# Patient Record
Sex: FEMALE | Race: OTHER | Employment: UNEMPLOYED | URBAN - METROPOLITAN AREA
[De-identification: names, ages, dates, MRNs, and addresses within clinical notes are randomized per-mention and may not be internally consistent; named-entity substitution may affect disease eponyms.]

---

## 2017-09-21 ENCOUNTER — OFFICE VISIT (OUTPATIENT)
Dept: FAMILY MEDICINE CLINIC | Age: 47
End: 2017-09-21

## 2017-09-21 ENCOUNTER — HOSPITAL ENCOUNTER (OUTPATIENT)
Dept: LAB | Age: 47
Discharge: HOME | End: 2017-09-21

## 2017-09-21 VITALS
DIASTOLIC BLOOD PRESSURE: 112 MMHG | BODY MASS INDEX: 36.29 KG/M2 | SYSTOLIC BLOOD PRESSURE: 184 MMHG | WEIGHT: 180 LBS | HEIGHT: 59 IN | HEART RATE: 62 BPM | TEMPERATURE: 98.5 F

## 2017-09-21 DIAGNOSIS — I10 ESSENTIAL HYPERTENSION: Primary | ICD-10-CM

## 2017-09-21 DIAGNOSIS — H53.8 BLURRY VISION: ICD-10-CM

## 2017-09-21 DIAGNOSIS — E78.5 HYPERLIPIDEMIA, UNSPECIFIED HYPERLIPIDEMIA TYPE: ICD-10-CM

## 2017-09-21 DIAGNOSIS — R73.03 PREDIABETES: ICD-10-CM

## 2017-09-21 DIAGNOSIS — I10 ESSENTIAL HYPERTENSION: ICD-10-CM

## 2017-09-21 PROCEDURE — 85025 COMPLETE CBC W/AUTO DIFF WBC: CPT | Performed by: FAMILY MEDICINE

## 2017-09-21 PROCEDURE — 83036 HEMOGLOBIN GLYCOSYLATED A1C: CPT | Performed by: FAMILY MEDICINE

## 2017-09-21 PROCEDURE — 80061 LIPID PANEL: CPT | Performed by: FAMILY MEDICINE

## 2017-09-21 PROCEDURE — 80048 BASIC METABOLIC PNL TOTAL CA: CPT | Performed by: FAMILY MEDICINE

## 2017-09-21 RX ORDER — LOVASTATIN 20 MG/1
20 TABLET ORAL
Qty: 90 TAB | Refills: 3 | Status: SHIPPED | OUTPATIENT
Start: 2017-09-21 | End: 2017-09-26 | Stop reason: SDUPTHER

## 2017-09-21 RX ORDER — LISINOPRIL AND HYDROCHLOROTHIAZIDE 20; 25 MG/1; MG/1
1 TABLET ORAL DAILY
Qty: 90 TAB | Refills: 1 | Status: SHIPPED | OUTPATIENT
Start: 2017-09-21 | End: 2018-06-28 | Stop reason: SDUPTHER

## 2017-09-21 NOTE — LETTER
9/26/2017 10:18 AM 
 
Ms. Bond Cat Anita Albarado [facility] [id number] Dear Ronda Shelton: Please find your most recent results below. Resulted Orders LIPID PANEL Result Value Ref Range LIPID PROFILE Cholesterol, total 188 <200 MG/DL Triglyceride 84 <150 MG/DL Comment:  
   Based on NCEP-ATP III:  Triglycerides <150 mg/dL  is considered normal, 150-199 mg/dL  borderline high,  200-499 mg/dL high and  greater than or equal to 500 mg/dL very high. HDL Cholesterol 58 MG/DL Comment:  
   Based on NCEP ATP III, HDL Cholesterol <40 mg/dL is considered low and >60 mg/dL is elevated. LDL, calculated 113.2 (H) 0 - 100 MG/DL Comment:  
   Based on the NCEP-ATP: LDL-C concentrations are considered  optimal <100 mg/dL, 
near optimal/above Normal 100-129 mg/dL Borderline High: 130-159, High: 160-189 mg/dL Very High: Greater than or equal to 190 mg/dL VLDL, calculated 16.8 MG/DL  
 CHOL/HDL Ratio 3.2 0 - 5.0    
CBC WITH AUTOMATED DIFF Result Value Ref Range WBC 8.3 3.6 - 11.0 K/uL  
 RBC 4.70 3.80 - 5.20 M/uL  
 HGB 13.9 11.5 - 16.0 g/dL HCT 42.4 35.0 - 47.0 % MCV 90.2 80.0 - 99.0 FL  
 MCH 29.6 26.0 - 34.0 PG  
 MCHC 32.8 30.0 - 36.5 g/dL  
 RDW 14.3 11.5 - 14.5 % PLATELET 581 378 - 363 K/uL NEUTROPHILS 52 32 - 75 % LYMPHOCYTES 40 12 - 49 % MONOCYTES 5 5 - 13 % EOSINOPHILS 3 0 - 7 % BASOPHILS 0 0 - 1 %  
 ABS. NEUTROPHILS 4.2 1.8 - 8.0 K/UL  
 ABS. LYMPHOCYTES 3.4 0.8 - 3.5 K/UL  
 ABS. MONOCYTES 0.4 0.0 - 1.0 K/UL  
 ABS. EOSINOPHILS 0.3 0.0 - 0.4 K/UL  
 ABS. BASOPHILS 0.0 0.0 - 0.1 K/UL METABOLIC PANEL, BASIC Result Value Ref Range Sodium 143 136 - 145 mmol/L Potassium 3.4 (L) 3.5 - 5.1 mmol/L Chloride 105 97 - 108 mmol/L  
 CO2 30 21 - 32 mmol/L Anion gap 8 5 - 15 mmol/L Glucose 90 65 - 100 mg/dL BUN 12 6 - 20 MG/DL  Creatinine 0.61 0.55 - 1.02 MG/DL  
 BUN/Creatinine ratio 20 12 - 20    
 GFR est AA >60 >60 ml/min/1.73m2 GFR est non-AA >60 >60 ml/min/1.73m2 Comment:  
   Estimated GFR is calculated using the IDMS-traceable Modification of Diet in Renal Disease (MDRD) Study equation, reported for both  Americans (GFRAA) and non- Americans (GFRNA), and normalized to 1.73m2 body surface area. The physician must decide which value applies to the patient. The MDRD study equation should only be used in individuals age 25 or older. It has not been validated for the following: pregnant women, patients with serious comorbid conditions, or on certain medications, or persons with extremes of body size, muscle mass, or nutritional status. Calcium 8.8 8.5 - 10.1 MG/DL  
HEMOGLOBIN A1C WITH EAG Result Value Ref Range Hemoglobin A1c 6.5 (H) 4.2 - 6.3 % Est. average glucose 140 mg/dL Comment:  
   (NOTE) The eAG should be interpreted with patient characteristics in mind  
since ethnicity, interindividual differences, red cell lifespan,  
variation in rates of glycation, etc. may affect the validity of the  
calculation. RECOMMENDATIONS: 
Per our phone conversation today. See attached on diabetes. Please call me if you have any questions: [phone] Sincerely, Ivelisse Nixon RN for Dr Pamela Navarro.

## 2017-09-21 NOTE — PROGRESS NOTES
Coordination of Care  1. Have you been to the ER, urgent care clinic since your last visit? Hospitalized since your last visit? No    2. Have you seen or consulted any other health care providers outside of the [facility] since your last visit? Include any pap smears or colon screening. No    Medications  Medication Reconciliation Performed: no  Patient does need refills     Learning Assessment Complete?  yes

## 2017-09-21 NOTE — PROGRESS NOTES
Pedro Green is a 55 y.o. female    Issues discussed today include:    1) HTN:  Chronic. Was taking lisinopril 20-25mg daily, but ran out of meds 2-3 months ago. No side effects while on med. Doesn't check BP at home. Reports having HA > 1 mo ago, but none recently. Denies CP, SOB, palpitations, neuro sxs. 2) HLD:  Chronic. Was taking mevacor 20mg daily. Denies myalgias, weakness while on this medication. Ran out 2 mo ago. Requesting refill. 3) Blurry vision:  Has been going on for months. Been a while since she had an exam, is interested in this. No known h/o DM. No eye discharge or pain. 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 90 Tab 1    lovastatin (MEVACOR) 20 mg tablet Take 1 Tab by mouth nightly. 90 Tab 3       Allergies:  No Known Allergies    LMP:  No LMP recorded. Patient is not currently having periods (Reason: Menopause). Social History     Social History    Marital status: SINGLE     Spouse name: N/A    Number of children: N/A    Years of education: N/A     Occupational History    Not on file.      Social History Main Topics    Smoking status: Former Smoker     Types: Cigarettes     Quit date: 7/9/2013    Smokeless tobacco: Not on file      Comment: social every other week    Alcohol use No    Drug use: No    Sexual activity: Not on file     Other Topics Concern    Not on file     Social History Narrative No family history on file. ROS:   Chest Pain:  No  SOB:  No      Physical Exam  Visit Vitals    BP (!) 184/112 (BP 1 Location: Left arm, BP Patient Position: Sitting)    Pulse 62    Temp 98.5 °F (36.9 °C) (Oral)    Ht 4' 11.25\" (1.505 m)    Wt 180 lb (81.6 kg)    BMI 36.05 kg/m2     BP Readings from Last 3 Encounters:   09/21/17 (!) 184/112   12/05/16 106/81   11/03/16 (!) 196/105         Constitutional: Appears well,  No acute distress, Vitals noted  Psychiatric:  Affect normal, Alert and Oriented to person/place/time  Eyes:  Conjunctiva clear, no drainage. PERRL. EOMI.  ENT:  External ears and nose normal, Teeth and gums appear healthy, Mucous membranes moist  Neck:  General inspection normal. Supple. Heart:  Normal HR, Normal S1 and S2,  Regular rhythm. No murmurs, rubs or gallops. No carotid bruit. Lungs:  Clear to auscultation, good respiratory effort, no wheezes, rales or rhonchi  Extremities:  Without edema, good peripheral pulses  Skin:  Warm to palpation, without rashes        Assessment/Plan:      ICD-10-CM ICD-9-CM    1. Essential hypertension I10 401.9 CBC WITH AUTOMATED DIFF      METABOLIC PANEL, BASIC      lisinopril-hydroCHLOROthiazide (PRINZIDE, ZESTORETIC) 20-25 mg per tablet      FL COLLECTION VENOUS BLOOD,VENIPUNCTURE   2. Hyperlipidemia, unspecified hyperlipidemia type E78.5 272.4 LIPID PANEL      lovastatin (MEVACOR) 20 mg tablet      FL COLLECTION VENOUS BLOOD,VENIPUNCTURE   3. Prediabetes R73.03 790.29 HEMOGLOBIN A1C WITH EAG      FL COLLECTION VENOUS BLOOD,VENIPUNCTURE   4. Blurry vision H53.8 368.8      BP high, has been out meds for 2-3 months  Labs today - r/o DM and need for ophtho through access now, otherwise would rec optometry screening, info to be given today  Refilled meds  Close follow up  Pt instructed to check BP q3-4 days and return sooner if consistently > 140/90    Follow-up Disposition:  Return in about 2 weeks (around 10/5/2017) for HTN follow up.   Discuss [facility] appt for well woman exam with pap - last pap 2013 with neg cytology, no HPV testing      ELow Marin MD  [address] [place] [address]

## 2017-09-21 NOTE — PROGRESS NOTES
Printed AVS, provided to pt and reviewed. Pt indicated understanding and had no questions. Told pt that rx's have been sent to pharmacy and they should be ready for  in approximately 2 hrs. The pt's medication that was ordered today was reviewed with the pt. Provided pt with eyecare resources. Pt was told to not to go to any of these optometrist until after her lab results were back because she may need to see a specialist to examine her eyes/retina instead. Explained AN if she needs a specialist she would be referred to [facility]. Explained [facility] process to pt and told them that one of our financial screeners will call them at home for financial information. The Dr had given the pt information including address, map, and phone # for [address]. RN explained financial screening process. Taina Galeas was the .  Jose Elias Chavez RN

## 2017-09-22 LAB
ANION GAP SERPL CALC-SCNC: 8 MMOL/L (ref 5–15)
BASOPHILS # BLD: 0 K/UL (ref 0–0.1)
BASOPHILS NFR BLD: 0 % (ref 0–1)
BUN SERPL-MCNC: 12 MG/DL (ref 6–20)
BUN/CREAT SERPL: 20 (ref 12–20)
CALCIUM SERPL-MCNC: 8.8 MG/DL (ref 8.5–10.1)
CHLORIDE SERPL-SCNC: 105 MMOL/L (ref 97–108)
CHOLEST SERPL-MCNC: 188 MG/DL
CO2 SERPL-SCNC: 30 MMOL/L (ref 21–32)
CREAT SERPL-MCNC: 0.61 MG/DL (ref 0.55–1.02)
EOSINOPHIL # BLD: 0.3 K/UL (ref 0–0.4)
EOSINOPHIL NFR BLD: 3 % (ref 0–7)
ERYTHROCYTE [DISTWIDTH] IN BLOOD BY AUTOMATED COUNT: 14.3 % (ref 11.5–14.5)
EST. AVERAGE GLUCOSE BLD GHB EST-MCNC: 140 MG/DL
GLUCOSE SERPL-MCNC: 90 MG/DL (ref 65–100)
HBA1C MFR BLD: 6.5 % (ref 4.2–6.3)
HCT VFR BLD AUTO: 42.4 % (ref 35–47)
HDLC SERPL-MCNC: 58 MG/DL
HDLC SERPL: 3.2 {RATIO} (ref 0–5)
HGB BLD-MCNC: 13.9 G/DL (ref 11.5–16)
LDLC SERPL CALC-MCNC: 113.2 MG/DL (ref 0–100)
LIPID PROFILE,FLP: ABNORMAL
LYMPHOCYTES # BLD: 3.4 K/UL (ref 0.8–3.5)
LYMPHOCYTES NFR BLD: 40 % (ref 12–49)
MCH RBC QN AUTO: 29.6 PG (ref 26–34)
MCHC RBC AUTO-ENTMCNC: 32.8 G/DL (ref 30–36.5)
MCV RBC AUTO: 90.2 FL (ref 80–99)
MONOCYTES # BLD: 0.4 K/UL (ref 0–1)
MONOCYTES NFR BLD: 5 % (ref 5–13)
NEUTS SEG # BLD: 4.2 K/UL (ref 1.8–8)
NEUTS SEG NFR BLD: 52 % (ref 32–75)
PLATELET # BLD AUTO: 308 K/UL (ref 150–400)
POTASSIUM SERPL-SCNC: 3.4 MMOL/L (ref 3.5–5.1)
RBC # BLD AUTO: 4.7 M/UL (ref 3.8–5.2)
SODIUM SERPL-SCNC: 143 MMOL/L (ref 136–145)
TRIGL SERPL-MCNC: 84 MG/DL (ref ?–150)
VLDLC SERPL CALC-MCNC: 16.8 MG/DL
WBC # BLD AUTO: 8.3 K/UL (ref 3.6–11)

## 2017-09-25 RX ORDER — METFORMIN HYDROCHLORIDE 500 MG/1
500 TABLET, EXTENDED RELEASE ORAL
Qty: 30 TAB | Refills: 2 | Status: SHIPPED | OUTPATIENT
Start: 2017-09-25 | End: 2017-11-16 | Stop reason: SDUPTHER

## 2017-09-25 NOTE — PROGRESS NOTES
Nurse to please call and inform patient of the following (** represent recommendations for patient) - thank you:  A1c 6.5% - now in diabetic range, but just barely  ** I recommend starting metformin 500mg daily to help prevent worsening DM, can also aid with weight loss -  I sent rx to pharmacy  ** Work on diet and exercise, weight loss. Avoid concentrated sweets, no soda. Aim for 20 minutes of aerobic exercise daily or 150 minutes weekly. Lipid panel - nearly at goal. Tot chol good.  (goal < 100). HDL 58 (>40 heart protective). 10 yr ASVCD risk is 7.2%. ** Increase mevacor to 40mg daily to meet recommendations for those with diabetes to be on moderate intensity dosing (can take 2 tabs nightly of current rx)  BMP wnl, renal function good. Mildly low potassium, can repeat at f/u - no change to therapy  CBC - blood counts are normal. You do not have anemia, signs of infection or problems with clotting cells.

## 2017-09-26 DIAGNOSIS — E78.5 HYPERLIPIDEMIA, UNSPECIFIED HYPERLIPIDEMIA TYPE: ICD-10-CM

## 2017-09-26 RX ORDER — LOVASTATIN 20 MG/1
40 TABLET ORAL
Qty: 90 TAB | Refills: 7 | Status: SHIPPED | OUTPATIENT
Start: 2017-09-26 | End: 2017-11-16 | Stop reason: DRUGHIGH

## 2017-09-26 NOTE — TELEPHONE ENCOUNTER
Resent lovastatin rx for 40mg nightly  Will keep 20mg tabs (take 2 tabs nightly) due to cost savings at [address]

## 2017-09-26 NOTE — TELEPHONE ENCOUNTER
Recent increase in Lovastatin and will need a change in e script sent. Routing to provider to review and complete.

## 2017-09-26 NOTE — PROGRESS NOTES
Long phone call w/pt this am assisted by [facility] phone  # [phone]. Reviewed information per provider DR Juni Rossi notes. Instructed on new Metformin sent to pharmacy yesterday re A1C 6.5. Discussed A1C levels. Had pt get her bottles of meds and reviewed doses and reasons for medications. Instructed on increasing Lovastatin 20 mg to 2 tabs nightly. Instructed on exercise, watching diet, no concentrated sweets, no sodas. Letter done w/ recent labs and included handouts of Diabetes and dietary recommendations. Recommend f/u in 3 months. Will routed message to [facility] front office to schedule f/u in Dec 2017 before Metformin runs out. Instructed to check bp at [facility] . \"I have seen it but have not used it because I do not know how\". Encouraged to have someone help her use BP machine. Reviewed her recent elevated BP at clinic. Routing message to provider to change lovastatin order as she has increased dose. Routing to [facility]  to schedule f/u in Dec 2017.

## 2017-10-02 ENCOUNTER — DOCUMENTATION ONLY (OUTPATIENT)
Dept: FAMILY MEDICINE CLINIC | Age: 47
End: 2017-10-02

## 2017-11-16 ENCOUNTER — OFFICE VISIT (OUTPATIENT)
Dept: FAMILY MEDICINE CLINIC | Age: 47
End: 2017-11-16

## 2017-11-16 VITALS
HEART RATE: 67 BPM | WEIGHT: 179 LBS | BODY MASS INDEX: 36.08 KG/M2 | HEIGHT: 59 IN | DIASTOLIC BLOOD PRESSURE: 80 MMHG | TEMPERATURE: 98.1 F | SYSTOLIC BLOOD PRESSURE: 137 MMHG

## 2017-11-16 DIAGNOSIS — E78.5 HYPERLIPIDEMIA, UNSPECIFIED HYPERLIPIDEMIA TYPE: ICD-10-CM

## 2017-11-16 DIAGNOSIS — I10 ESSENTIAL HYPERTENSION: Primary | ICD-10-CM

## 2017-11-16 DIAGNOSIS — N30.01 ACUTE CYSTITIS WITH HEMATURIA: ICD-10-CM

## 2017-11-16 DIAGNOSIS — R73.9 ELEVATED BLOOD SUGAR: ICD-10-CM

## 2017-11-16 DIAGNOSIS — Z13.9 ENCOUNTER FOR SCREENING: ICD-10-CM

## 2017-11-16 LAB
BILIRUB UR QL STRIP: NORMAL
GLUCOSE POC: NORMAL MG/DL
GLUCOSE UR-MCNC: NEGATIVE MG/DL
KETONES P FAST UR STRIP-MCNC: NEGATIVE MG/DL
PH UR STRIP: 6.5 [PH] (ref 4.6–8)
PROT UR QL STRIP: NEGATIVE
SP GR UR STRIP: 1.01 (ref 1–1.03)
UA UROBILINOGEN AMB POC: NORMAL (ref 0.2–1)
URINALYSIS CLARITY POC: NORMAL
URINALYSIS COLOR POC: YELLOW
URINE BLOOD POC: NORMAL
URINE LEUKOCYTES POC: NORMAL
URINE NITRITES POC: POSITIVE

## 2017-11-16 RX ORDER — LOVASTATIN 40 MG/1
40 TABLET ORAL
Qty: 90 TAB | Refills: 1 | Status: SHIPPED | OUTPATIENT
Start: 2017-11-16 | End: 2018-06-28 | Stop reason: SDUPTHER

## 2017-11-16 RX ORDER — METFORMIN HYDROCHLORIDE 500 MG/1
500 TABLET, EXTENDED RELEASE ORAL
Qty: 90 TAB | Refills: 1 | Status: SHIPPED | OUTPATIENT
Start: 2017-11-16 | End: 2018-06-28 | Stop reason: SDUPTHER

## 2017-11-16 RX ORDER — CEPHALEXIN 500 MG/1
500 CAPSULE ORAL 3 TIMES DAILY
Qty: 15 CAP | Refills: 0 | Status: SHIPPED | OUTPATIENT
Start: 2017-11-16 | End: 2017-11-21

## 2017-11-16 NOTE — PROGRESS NOTES
Results for orders placed or performed in visit on 11/16/17   AMB POC GLUCOSE BLOOD, BY GLUCOSE MONITORING DEVICE   Result Value Ref Range    Glucose  NF mg/dL   AMB POC URINALYSIS DIP STICK MANUAL W/O MICRO   Result Value Ref Range    Color (UA POC) Yellow     Clarity (UA POC) Cloudy     Glucose (UA POC) Negative Negative    Bilirubin (UA POC) Trace Negative    Ketones (UA POC) Negative Negative    Specific gravity (UA POC) 1.010 1.001 - 1.035    Blood (UA POC) 1+ Negative    pH (UA POC) 6.5 4.6 - 8.0    Protein (UA POC) Negative Negative    Urobilinogen (UA POC) normal 0.2 - 1    Nitrites (UA POC) Positive Negative    Leukocyte esterase (UA POC) Trace Negative

## 2017-11-16 NOTE — PROGRESS NOTES
Coordination of Care  1. Have you been to the ER, urgent care clinic since your last visit? Hospitalized since your last visit? No    2. Have you seen or consulted any other health care providers outside of the [address] [place] [address] since your last visit? Include any pap smears or colon screening. No    Medications  Does the patient need refills?  YES    Learning Assessment Complete? yes    Results for orders placed or performed in visit on 11/16/17   AMB POC GLUCOSE BLOOD, BY GLUCOSE MONITORING DEVICE   Result Value Ref Range    Glucose  NF mg/dL

## 2017-11-16 NOTE — PROGRESS NOTES
Subjective:     Percy Anthony is a 55 y.o. female who presents for follow up of htn and early diabetes, with hyperlipidemia. Ran out of metformin because she didn't understand refills, taking other meds correctly. Diet and Lifestyle: sedentary    Cardiovascular ROS: no medication side effects noted, no chest pain on exertion, no dyspnea on exertion, no swelling of ankles. New concerns: urine smells bad. Tried to get appt at [address] [place] [address] but thinks she was not allowed because she didn't call in time to cancel appt a long time ago. Objective:     Vitals 11/16/2017 9/21/2017 9/21/2017 12/5/2016 11/3/2016 11/3/2016 7/9/2015   Blood Pressure 137/80 184/112 192/109 106/81 196/105 183/111 126/74   Pulse 67 62 59 80 68 68 62   Temp 98.1 - 98.5 98.5 - 98.6 98.4   Height 4' 11.252\" - 4' 11.252\" 4' 11.528\" - 4' 11.528\" 4' 11.449\"   Weight 179 lb - 180 lb 156 lb - 174 lb 179 lb 9.6 oz   BSA 1.84 m2 - 1.85 m2 1.72 m2 - 1.82 m2 1.85 m2   BMI 35.85 kg/m2 - 36.05 kg/m2 30.95 kg/m2 - 34.52 kg/m2 35.73 kg/m2   BP comment - - - - - - -     appearance: alert, well appearing, and in no distress. General exam: CVS exam BP noted to be well controlled today in office, S1, S2 normal, no gallop, no murmur, chest clear, no JVD, no HSM, no edema. Lab review: UA + nitrite and blood    Assessment/Plan:     diabetes well controlled, hypertension well controlled, hyperlipidemia control uncertain, UTI. Restart metformin, keflex for uti, f/u 3 months and will check lipids then.

## 2018-02-22 ENCOUNTER — HOSPITAL ENCOUNTER (OUTPATIENT)
Dept: LAB | Age: 48
Discharge: HOME | End: 2018-02-22

## 2018-02-22 ENCOUNTER — OFFICE VISIT (OUTPATIENT)
Dept: FAMILY MEDICINE CLINIC | Age: 48
End: 2018-02-22

## 2018-02-22 VITALS
HEART RATE: 66 BPM | SYSTOLIC BLOOD PRESSURE: 120 MMHG | HEIGHT: 60 IN | WEIGHT: 173 LBS | DIASTOLIC BLOOD PRESSURE: 83 MMHG | TEMPERATURE: 98.4 F | BODY MASS INDEX: 33.96 KG/M2

## 2018-02-22 DIAGNOSIS — E11.9 DM TYPE 2, GOAL HBA1C < 7% (HCC): ICD-10-CM

## 2018-02-22 DIAGNOSIS — N95.1 MENOPAUSAL VAGINAL DRYNESS: ICD-10-CM

## 2018-02-22 DIAGNOSIS — Z13.9 ENCOUNTER FOR SCREENING: Primary | ICD-10-CM

## 2018-02-22 LAB
BILIRUB UR QL STRIP: NEGATIVE
EST. AVERAGE GLUCOSE BLD GHB EST-MCNC: 137 MG/DL
GLUCOSE POC: NORMAL MG/DL
GLUCOSE UR-MCNC: NEGATIVE MG/DL
HBA1C MFR BLD: 6.4 % (ref 4.2–6.3)
KETONES P FAST UR STRIP-MCNC: NEGATIVE MG/DL
PH UR STRIP: 6.5 [PH] (ref 4.6–8)
PROT UR QL STRIP: NEGATIVE
SP GR UR STRIP: 1.03 (ref 1–1.03)
UA UROBILINOGEN AMB POC: NORMAL (ref 0.2–1)
URINALYSIS CLARITY POC: CLEAR
URINALYSIS COLOR POC: YELLOW
URINE BLOOD POC: NEGATIVE
URINE LEUKOCYTES POC: NEGATIVE
URINE NITRITES POC: NEGATIVE

## 2018-02-22 PROCEDURE — 83036 HEMOGLOBIN GLYCOSYLATED A1C: CPT | Performed by: PHYSICIAN ASSISTANT

## 2018-02-22 NOTE — PROGRESS NOTES
Coordination of Care  1. Have you been to the ER, urgent care clinic since your last visit? Hospitalized since your last visit? No    2. Have you seen or consulted any other health care providers outside of the [address] [place] [address] since your last visit? Include any pap smears or colon screening. No    Does the patient need refills?  YES    Learning Assessment Complete? yes  Results for orders placed or performed in visit on 02/22/18   AMB POC GLUCOSE BLOOD, BY GLUCOSE MONITORING DEVICE   Result Value Ref Range    Glucose POC NF 94 mg/dL     Results for orders placed or performed in visit on 02/22/18   AMB POC GLUCOSE BLOOD, BY GLUCOSE MONITORING DEVICE   Result Value Ref Range    Glucose POC NF 94 mg/dL   AMB POC URINALYSIS DIP STICK MANUAL W/O MICRO   Result Value Ref Range    Color (UA POC) Yellow     Clarity (UA POC) Clear     Glucose (UA POC) Negative Negative    Bilirubin (UA POC) Negative Negative    Ketones (UA POC) Negative Negative    Specific gravity (UA POC) 1.030 1.001 - 1.035    Blood (UA POC) Negative Negative    pH (UA POC) 6.5 4.6 - 8.0    Protein (UA POC) Negative Negative    Urobilinogen (UA POC) normal 0.2 - 1    Nitrites (UA POC) Negative Negative    Leukocyte esterase (UA POC) Negative Negative

## 2018-02-22 NOTE — MR AVS SNAPSHOT
[facility] [address] 
[phone] Patient: Maile Lambert MRN: [id number] :1970 Visit Information Edgardo Spear Personal Médico Departamento Teléfono del Dep. Número de visita 2018 10:15 AM Milton Douglass 104, [place] 1554 Surgeons  [phone] Follow-up Instructions Return in about 3 months (around 2018). Upcoming Health Maintenance Date Due DTaP/Tdap/Td series (1 - Tdap) 1991 PAP AKA CERVICAL CYTOLOGY 2016 Influenza Age 5 to Adult 2017 Alergias  Review Complete El: 2017 Por: Ramakrishna Khalil A partir del:  2018 No Known Allergies Vacunas actuales Lissett Coronado Burniris Influenza Vaccine (Quad) PF 11/3/2016 No revisadas esta visita You Were Diagnosed With   
  
 Vasu Fraction Encounter for screening    -  Primary ICD-10-CM: Z13.9 ICD-9-CM: V82.9 DM type 2, goal HbA1c < 7% (HCC)     ICD-10-CM: E11.9 ICD-9-CM: 250.00 Menopausal vaginal dryness     ICD-10-CM: N95.1 ICD-9-CM: 627.2 Partes vitales PS Pulso Temperatura Lee ( percentil de crecimiento) Peso (percentil de crecimiento) BMI [facility]) 120/83 (BP 1 Location: Right arm, BP Patient Position: Sitting) 66 98.4 °F (36.9 °C) (Oral) 4' 11.94\" (1.523 m) 173 lb (78.5 kg) 33.85 kg/m2 Estado obstétrico Estatus de tabaquísmo Menopause Former Smoker Historial de signos vitales BMI and BSA Data Body Mass Index Body Surface Area  
 33.85 kg/m 2 1.82 m 2 [facility] Name Phone [address], [address] [place] [phone] Pearson lista de medicamentos actualizada Lista actualizada 18 11:21 AM.  Courtney Sandra use pearson lista de medicamentos más reciente. lisinopril-hydroCHLOROthiazide 20-25 mg per tablet También conocido veronika:  Azael Howard Take 1 Tab by mouth daily. lovastatin 40 mg tablet También conocido veronika:  MEVACOR Take 1 Tab by mouth nightly. Instead of the 20mg (Kiswahili). metFORMIN  mg tablet También conocido veronika:  GLUCOPHAGE XR Take 1 Tab by mouth daily (with dinner). Hicimos lo siguiente AMB POC GLUCOSE BLOOD, BY GLUCOSE MONITORING DEVICE [01272 CPT(R)] AMB POC URINALYSIS DIP STICK MANUAL W/O MICRO [47695 CPT(R)] Instrucciones de seguimiento Return in about 3 months (around 5/22/2018). Por hacer 02/22/2018 Lab:  HEMOGLOBIN A1C WITH EAG Instrucciones para el Paciente Aprenda acerca de la menopausia - [ Learning About Menopause ] Qué es la menopausia? Para la mayoría de [facility], la menopausia es un proceso natural de envejecimiento. Los períodos menstruales gradualmente se detienen. La capacidad para quedar Nino Brakeman a lanier fin. Algunas mujeres sienten alivio cuando noel años de maternidad terminan. Trav otras luchan con los cambios físicos y emocionales que vienen con la menopausia. En la [employer], la menopausia se presenta a alrededor de los 48 Los eduardo. Trav el cuerpo de cada gwen tiene lanier propio desarrollo cronológico. Algunas mujeres kenia de tener noel períodos en la mitad de la década de los 36 años de [place]. Otras los siguen teniendo hasta adilene [employer] 48. Y algunas mujeres pasan por la menopausia antes debido a un tratamiento contra el cáncer o a josh cirugía para extraer los ovarios. Qué puede esperar con la menopausia? · Comienza con la perimenopausia. [place] es el proceso de cambio que conduce a la menopausia. La perimenopausia puede empezar ya hacia finales de los 30 años o no aparecer hasta principios de los 48. Lanier duración varía, trav suele durar entre 2 y [address] [facility]. · Lisa ana m proceso, noel niveles hormonales ascenderán y descenderán de manera desigual (fluctuarán). [place] causa alteraciones en noel períodos y otros síntomas. Con el tiempo, los niveles de estrógeno y de progesterona descienden lo suficiente veronika para que el ciclo menstrual se detenga. Un año completo sin tener un período generalmente se considera veronika menopausia. · Los niveles bajos de estrógeno después de la menopausia aceleran la pérdida de masa ósea. [place] aumenta lanier riesgo de osteoporosis. Además, el riesgo de tener josh enfermedad cardíaca aumenta después de la menopausia. · Es normal que la piel se afine y esté más reseca y [place] después de la menopausia. El revestimiento vaginal y las vías urinarias inferiores también pierden tejido y se debilitan. [place] puede hacer que sea difícil tener relaciones sexuales. También puede aumentar el riesgo de infecciones vaginales y [place]. Cuáles son los síntomas? · Períodos más ligeros o más abundantes. Lanier ciclo menstrual puede ser Mouna [place] corto. Es posible que se salte períodos. · Bochornos. Puede tener josh sensación repentina de calor intenso. Puede sudar y [employer] lala, el sonal y el pecho enrojecidos. Además de bochornos, usted puede tener latidos del corazón demasiado rápidos o irregulares. Puede sentirse ansiosa o malhumorada. En casos raros, puede sentir pánico. 
· Problemas para dormir. · Cambios repentinos del estado de ánimo o sentirse malhumorada, deprimida o preocupada. · Problemas para recordar o pensar con claridad. · Sequedad vaginal. 
Algunas mujeres tienen solo unos pocos síntomas leves. Otras tienen síntomas graves que perturban el sueño y la elo diaria. Los síntomas tienden a durar o a empeorar el primer año o más después de la menopausia. Con el tiempo, las hormonas se estabilizan a niveles bajos. Muchos síntomas mejoran o desaparecen. Sin embargo, algunas mujeres pueden tener síntomas que no desaparecen. Cómo se tratan los síntomas de la menopausia? ?Si tiene síntomas leves, puede obtener algo de alivio al comer alimentos saludables, hacer ejercicio y disminuir lanier estrés. Algunas mujeres deciden tyesha medicamentos si tienen síntomas graves que no disminuyen haciendo cambios en lanier estilo de elo. ?Cambios en el estilo de elo ? · Elija josh dieta saludable para el corazón que sea baja en grasas saturadas. [place] debería incluir mucho pescado, frutas, verduras, frijoles, y granos y panes con un alto contenido de [place]. Asegúrese de tyesha suficiente calcio y vitamina D para ayudar a que noel huesos se mantengan jasbir. Los productos lácteos bajos en grasa o sin grasa son Sunshine Altes excelente fanta de calcio. ? · Aundria Isa de Amelia regular. El ejercicio puede ayudarla a manejar lanier [place], a mantener el corazón y los huesos jasbir, y a levantarle el ánimo. ? · Limite la cafeína, el alcohol y el estrés. Estas cosas pueden empeorar noel síntomas. Limitar lanier consumo puede ayudarla a dormir mejor. ? · Si fuma, deje de hacerlo. Dejar de fumar puede reducir los bochornos y los riesgos para la darlene a Debbrah Stain. Medicamentos ? Si noel síntomas son graves, hable con lanier médico. Puede probar medicamentos con receta médica, veronika: 
? · Píldoras anticonceptivas de dosis baja antes de la menopausia. ? · Terapia hormonal (HT, por noel siglas en inglés) de dosis baja después de la menopausia. ? · Antidepresivos. ? · Un medicamento llamado clonidina (Catapres), que generalmente se [place] para tratar la presión arterial liza. ? Todos los [employer] para los síntomas de la menopausia tienen posibles riesgos o efectos secundarios. Josh cantidad muy pequeña de mujeres desarrollan problemas de darlene graves cuando reciben terapia hormonal. Asegúrese de hablar con lanier médico acerca de noel posibles riesgos para la darlene antes de comenzar un tratamiento para los síntomas de la [place]. ? Otros tratamientos ? Puede probar: ? · Ejercicios de respiración. Estos pueden ayudarla a reducir los bochornos y los síntomas emocionales. ? · Soya. Algunas mujeres creen que comer mucha soya ayuda a estabilizar los síntomas de la [place]. ? · Yoga o biorretroalimentación. Estos pueden ayudar a reducir el estrés. La atención de seguimiento es josh parte clave de lanier tratamiento y seguridad. Asegúrese de hacer y acudir a todas las citas, y llame a lanier médico si está teniendo problemas. También es josh buena idea saber los resultados de los exámenes y mantener josh lista de los medicamentos que janet. Dónde puede encontrar más información en inglés? Enrrique Fernandes a [email or web address] Escriba H199 en la búsqueda para aprender más acerca de \"Aprenda acerca de la menopausia - [ Learning About Menopause ]. \" 
Revisado: 13 octubre, 2016 Versión del contenido: 11.4 © 3344-9719 Healthwise, Incorporated. Las instrucciones de cuidado fueron adaptadas bajo licencia por [employer] (which disclaims liability or warranty for this information). Si usted tiene [place] afección médica o sobre estas instrucciones, siempre pregunte a lanier profesional de darlene. Healthwise, Incorporated niega toda garantía o responsabilidad por lanier uso de esta información. Introducing \[facility]\"" & HEALTH SERVICES! [facility] introduce portal paciente MyChart . Ahora se puede acceder a partes de lanier expediente médico, enviar por correo electrónico la oficina de lanier médico y solicitar renovaciones de medicamentos en línea. En lanier navegador de Internet , Vicente Daquan a [email or web address] [email or web address] [email or web address]. [employer]. [email or web address] Soni clic en el usuario por Jose Ann? Aniya Fuse clic aquí en la sesión Marely Kunz. Verá la página de registro [place]. Ingrese lanier código de [facility] paula y veronika aparece a continuación. Usted no tendrá que [employer] código después de shelby completado el proceso de registro .  Si usted no se inscribe antes de la fecha de caducidad , debe solicitar un nuevo código. · MyChart Código de acceso : [id number] Expires: 5/23/2018 11:21 AM 
 
Ingresa los últimos cuatro dígitos de lanier Número de Seguro Social ( xxxx ) y fecha de nacimiento ( dd / mm / aaaa ) veronika se indica y soni clic en Enviar. Usted será llevado a la siguiente página de registro . Crear un ID MyChart . Esta será lanier ID de inicio de sesión de MyChart y no puede ser [place] , por lo que pensar en josh que es Mario David y fácil de recordar . Crear josh contraseña MyChart . Usted puede cambiar lanier contraseña en cualquier momento . Ingrese lanier Password Reset de preguntas y [place] . [place] se puede utilizar en un momento posterior si usted olvida lanier contraseña. Introduzca lanier dirección de correo electrónico . Liudmila Celis recibirá josh notificación por correo electrónico cuando la nueva información está disponible en MyChart . Sang Blow clic en Registrarse. Littie Chi zach y descargar porciones de lanier expediente médico. 
Soni clic en el enlace de descarga del menú Resumen para descargar josh copia portátil de lanier información médica . Si tiene [employer] , por favor visite la sección de preguntas frecuentes del sitio web MyChart . Recuerde, MyChart NO es que se utilizará para las necesidades urgentes. Para emergencias médicas , llame al 911 . Ahora disponible en lanier iPhone y Android ! Por favor proporcione ana m resumen de la documentación de cuidado a lanier próximo proveedor. If you have any questions after today's visit, please call [phone].

## 2018-02-22 NOTE — PROGRESS NOTES
Assessment/Plan:    Diagnoses and all orders for this visit:    1. Encounter for screening  -     AMB POC GLUCOSE BLOOD, BY GLUCOSE MONITORING DEVICE    Refer to pap clinic or EWL for exam   Try KY jelly for intercourse     Follow-up Disposition: Not on File    [facility]. An AVS was printed and given to the patient.      ----------------------------------------------------------------------    Chief Complaint   Patient presents with    Hypertension     f/u    Diabetes     f/u       History of Present Illness:  Pt states that she is here for many days of \"bad urine\". When I question her more, she states that she does not have any burning with urination, only frequency of urination. She does not have bladder prolapse that she is aware of and she does not have stress or urge incontinence. She is , her youngest is 14 yo and she did not have large babies. She had menopause 4 years ago (at 37) and since then she has had painful intercourse and vaginal dryness. She is requesting a pap and is due for mammogram. She has never tried ky jelly. She is  for 3 years and there are no problems in her marriage. She has had lifelong painful intercourse with other partners she states.    She was told that she has a narrow vagina before and thinks that this is the cause of her sxs       Past Medical History:   Diagnosis Date    Abnormal menses 2013    Dysmenorrhea 2013    Headache(784.0) 2013    Hx of mammogram no hx    Marital conflict involving divorce 2013    Other and unspecified hyperlipidemia 2013    Pap smear for cervical cancer screening 2011    Pap smear for cervical cancer screening     Pap smear for cervical cancer screening unknown    Pelvic pain in female 2013    Thrombasthenia ([address] 2013    Unspecified essential hypertension 2013    Urinary tract infection, site not specified 4/29/2013       Current Outpatient Prescriptions   Medication Sig Dispense Refill    lovastatin (MEVACOR) 40 mg tablet Take 1 Tab by mouth nightly. Instead of the 20mg (Urdu). 90 Tab 1    metFORMIN ER (GLUCOPHAGE XR) 500 mg tablet Take 1 Tab by mouth daily (with dinner). 90 Tab 1    lisinopril-hydroCHLOROthiazide (PRINZIDE, ZESTORETIC) 20-25 mg per tablet Take 1 Tab by mouth daily. 90 Tab 1       No Known Allergies    Social History   Substance Use Topics    Smoking status: Former Smoker     Types: Cigarettes     Quit date: 7/9/2013    Smokeless tobacco: Never Used      Comment: social every other week    Alcohol use No       No family history on file.     Physical Exam:     Visit Vitals    /83 (BP 1 Location: Right arm, BP Patient Position: Sitting)    Pulse 66    Temp 98.4 °F (36.9 °C) (Oral)    Ht 4' 11.94\" (1.523 m)    Wt 173 lb (78.5 kg)    BMI 33.85 kg/m2       A&Ox3  WDWN NAD  Respirations normal and non labored

## 2018-02-22 NOTE — PROGRESS NOTES
At discharge station AVS was printed and reviewed with pt with [facility]  # [id number]. Provided pt with information and phone # for [facility]. Explained that they will do a financial screening before scheduling appt.  Elizabeth Reynoso RN

## 2018-02-22 NOTE — PATIENT INSTRUCTIONS
Aprenda acerca de la menopausia - [ Learning About Menopause ]  ¿Qué es la menopausia? Para la mayoría de las [facility], la menopausia es un proceso natural de envejecimiento. Los períodos menstruales gradualmente se detienen. La capacidad para quedar Bobby Curd a lanier fin. Algunas mujeres sienten alivio cuando noel años de maternidad terminan. Trav otras luchan con los cambios físicos y emocionales que vienen con la menopausia. En la [employer], la menopausia se presenta a alrededor de los 48 Los eduardo. Rtav el cuerpo de cada gwen tiene lanier propio desarrollo cronológico. Algunas mujeres kenia de tener noel períodos en la mitad de la década de los 36 años de [place]. Otras los siguen teniendo hasta adilene [employer] 48. Y algunas mujeres pasan por la menopausia antes debido a un tratamiento contra el cáncer o a josh cirugía para extraer los ovarios. ¿Qué puede esperar con la menopausia? · Comienza con la perimenopausia. [place] es el proceso de cambio que conduce a la menopausia. La perimenopausia puede empezar ya hacia finales de los 30 años o no aparecer hasta principios de los 48. Lanier duración varía, trav suele durar entre 2 y [address] · Lisa ana m proceso, noel niveles hormonales ascenderán y descenderán de manera desigual (fluctuarán). [place] causa alteraciones en noel períodos y otros síntomas. Con el tiempo, los niveles de estrógeno y de progesterona descienden lo suficiente veronika para que el ciclo menstrual se detenga. Un año completo sin tener un período generalmente se considera veronika menopausia. · Los niveles bajos de estrógeno después de la menopausia aceleran la pérdida de masa ósea. [place] aumenta lanier riesgo de osteoporosis. Además, el riesgo de tener josh enfermedad cardíaca aumenta después de la menopausia. · Es normal que la piel se afine y esté más reseca y [place] después de la menopausia. El revestimiento vaginal y las vías urinarias inferiores también pierden tejido y se debilitan.  [place] puede hacer que sea difícil tener relaciones sexuales. También puede aumentar el riesgo de infecciones vaginales y [place]. ¿Cuáles son los síntomas? · Períodos más ligeros o más abundantes. Lanier ciclo menstrual puede ser Frankey Lowe corto. Es posible que se salte períodos. · Bochornos. Puede tener josh sensación repentina de calor intenso. Puede sudar y [employer] lala, el sonal y el pecho enrojecidos. Además de bochornos, usted puede tener latidos del corazón demasiado rápidos o irregulares. Puede sentirse ansiosa o malhumorada. En casos raros, puede sentir pánico.  · Problemas para dormir. · Cambios repentinos del estado de ánimo o sentirse malhumorada, deprimida o preocupada. · Problemas para recordar o pensar con claridad. · Sequedad vaginal.  Algunas mujeres tienen solo unos pocos síntomas leves. Otras tienen síntomas graves que perturban el sueño y la elo diaria. Los síntomas tienden a durar o a empeorar el primer año o más después de la menopausia. Con el tiempo, las hormonas se estabilizan a niveles bajos. Muchos síntomas mejoran o desaparecen. Sin embargo, algunas mujeres pueden tener síntomas que no desaparecen. ¿Cómo se tratan los síntomas de la menopausia? ?Si tiene síntomas leves, puede obtener algo de alivio al comer alimentos saludables, hacer ejercicio y disminuir lanier estrés. Algunas mujeres deciden tyesha medicamentos si tienen síntomas graves que no disminuyen haciendo cambios en lanier estilo de elo. ?Cambios en el estilo de elo  ? · Elija josh dieta saludable para el corazón que sea baja en grasas saturadas. Marvin debería incluir mucho pescado, frutas, verduras, frijoles, y granos y panes con un alto contenido de [place]. Asegúrese de tyesha suficiente calcio y vitamina D para ayudar a que noel huesos se mantengan jasbir. Los productos lácteos bajos en grasa o sin grasa son [place] excelente fanta de calcio. ? · [address] de [place] regular.  El ejercicio puede ayudarla a manejar lanier [place], a mantener Thomasenia Muster y los huesos jasbir, y a levantarle el ánimo. ? · Limite la cafeína, el alcohol y el estrés. Estas cosas pueden empeorar noel síntomas. Limitar lanier consumo puede ayudarla a dormir mejor. ? · Si fuma, deje de hacerlo. Dejar de fumar puede reducir los bochornos y los riesgos para la darlene a Dannial Spry. Medicamentos  ? Si noel síntomas son graves, hable con lanier médico. Puede probar medicamentos con receta médica, veronika:  ? · Píldoras anticonceptivas de dosis baja antes de la menopausia. ? · Terapia hormonal (HT, por noel siglas en inglés) de dosis baja después de la menopausia. ? · Antidepresivos. ? · Un medicamento llamado clonidina (Catapres), que generalmente se [place] para tratar la presión arterial liza. ? Todos los [employer] para los síntomas de la menopausia tienen posibles riesgos o efectos secundarios. Josh cantidad muy pequeña de mujeres desarrollan problemas de darlene graves cuando reciben terapia hormonal. Asegúrese de hablar con lanier médico acerca de noel posibles riesgos para la darlene antes de comenzar un tratamiento para los síntomas de la [place]. ? Otros tratamientos  ? Puede probar:  ? · Ejercicios de respiración. Estos pueden ayudarla a reducir los bochornos y los síntomas emocionales. ? · Soya. Algunas mujeres creen que comer mucha soya ayuda a estabilizar los síntomas de la [place]. ? · Yoga o biorretroalimentación. Estos pueden ayudar a reducir el estrés. La atención de seguimiento es josh parte clave de lanier tratamiento y seguridad. Asegúrese de hacer y acudir a todas las citas, y llame a lanier médico si está teniendo problemas. También es josh buena idea saber los resultados de los exámenes y mantener josh lista de los medicamentos que janet. ¿Dónde puede encontrar más información en inglés? Audrey Cárdenas a [email or web address]-юлия.[email or web address]   Escriba H199 en la búsqueda para aprender más acerca de \"Aprenda acerca de la menopausia - [ Learning About Menopause ].\"  Revisado: 13 octubre, 2016  Versión del contenido: 11.4  © 9954-8617 Healthwise, Incorporated. Las instrucciones de cuidado fueron adaptadas bajo licencia por [employer] (which disclaims liability or warranty for this information). Si usted tiene [place] afección médica o sobre estas instrucciones, siempre pregunte a lanier profesional de darlene. Healthwise, Incorporated niega toda garantía o responsabilidad por lanier uso de esta información.

## 2018-04-19 ENCOUNTER — HOSPITAL ENCOUNTER (OUTPATIENT)
Dept: MAMMOGRAPHY | Age: 48
Discharge: HOME | End: 2018-04-19

## 2018-04-19 ENCOUNTER — OFFICE VISIT (OUTPATIENT)
Dept: FAMILY PLANNING/WOMEN'S HEALTH CLINIC | Age: 48
End: 2018-04-19

## 2018-04-19 VITALS — DIASTOLIC BLOOD PRESSURE: 95 MMHG | SYSTOLIC BLOOD PRESSURE: 142 MMHG

## 2018-04-19 DIAGNOSIS — Z12.31 VISIT FOR SCREENING MAMMOGRAM: ICD-10-CM

## 2018-04-19 DIAGNOSIS — Z01.419 ENCOUNTER FOR WELL WOMAN EXAM: Primary | ICD-10-CM

## 2018-04-19 PROCEDURE — 77067 SCR MAMMO BI INCL CAD: CPT

## 2018-04-19 NOTE — PROGRESS NOTES
Assessment/Plan:    Diagnoses and all orders for this visit:    1. Encounter for well woman exam        Follow-up Disposition: Not on File    124 TIMMY Boyer expressed understanding of this plan. An AVS was printed and given to the patient.      ----------------------------------------------------------------------    Chief Complaint   Patient presents with    Well Woman     EWL visit-breast only       History of Present Illness:  53 yo  both delivered   Last period 3 years ago  Today is her first mammogram  No family hx of breast cancer  No personal hx of breast problems  Menarche age 25? First baby born at 25       Past Medical History:   Diagnosis Date    Abnormal menses 2013    Dysmenorrhea 2013    Headache(784.0) 2013    Hx of mammogram no hx    Marital conflict involving divorce 2013    Other and unspecified hyperlipidemia 2013    Pap smear for cervical cancer screening 2011    Pap smear for cervical cancer screening     Pap smear for cervical cancer screening unknown    Pelvic pain in female 2013    Thrombasthenia ([address] 2013    Unspecified essential hypertension 2013    Urinary tract infection, site not specified 2013       Current Outpatient Prescriptions   Medication Sig Dispense Refill    lovastatin (MEVACOR) 40 mg tablet Take 1 Tab by mouth nightly. Instead of the 20mg (Polish). 90 Tab 1    metFORMIN ER (GLUCOPHAGE XR) 500 mg tablet Take 1 Tab by mouth daily (with dinner). 90 Tab 1    lisinopril-hydroCHLOROthiazide (PRINZIDE, ZESTORETIC) 20-25 mg per tablet Take 1 Tab by mouth daily. 90 Tab 1       No Known Allergies    Social History   Substance Use Topics    Smoking status: Former Smoker     Types: Cigarettes     Quit date: 2013    Smokeless tobacco: Never Used      Comment: social every other week    Alcohol use No       No family history on file.     Physical Exam:     Visit Vitals    BP Roshan Castro ) 142/95       A&Ox3  WDWN NAD  Respirations normal and non labored  Breast exam maxwell neg for mass, tenderness, skin color changes, dimpling, retraction

## 2018-04-19 NOTE — PROGRESS NOTES
EVERY WOMANS LIFE HISTORY QUESTIONNAIRE       No Yes Comments   Has a doctor ever seen or felt anything wrong with your breast? [x]                                  []                                     Have you ever had a breast biopsy? [x]                                  []                                          When and where was last mammogram performed? First one    Have you ever been told that there was a problem on your mammogram?   No Yes Comments   []                                  []                                  n/a     Do you have breast implants? No Yes Comments   [x]                                  []                                       When was your last Pap test performed? 9/2013     Have you ever been diagnosed with any type of Cancer   No Yes Comments (type,when,where,type of treatment   [x]                                  []                                          Has a family member been diagnosed with breast or ovarian cancer? No Yes Comments (which family members, and type   [x]                                  []                                         Have you been through menopause? No Yes Date of LMP   []                                  [x]                                       Are you taking hormone replacement therapy (HRT)     No Yes Comments   [x]                                  []                                       How many times have you been pregnant? 2      Number of live births ? 2    Are you experiencing any of the following? No Yes Comments   Nipple Discharge [x]                                  []                                     Breast Lump/Masses [x]                                  []                                     Breast Skin Changes [x]                                  []                                         Any other health problems? HTN, diabetes---follow[facility] at [facility]    List of Pap Providers given to pt?     No, but will call her to schedule pap appt with us (in June)

## 2018-06-28 ENCOUNTER — OFFICE VISIT (OUTPATIENT)
Dept: FAMILY MEDICINE CLINIC | Age: 48
End: 2018-06-28

## 2018-06-28 ENCOUNTER — HOSPITAL ENCOUNTER (OUTPATIENT)
Dept: LAB | Age: 48
Discharge: HOME | End: 2018-06-28

## 2018-06-28 VITALS
WEIGHT: 184 LBS | HEART RATE: 66 BPM | BODY MASS INDEX: 36.12 KG/M2 | SYSTOLIC BLOOD PRESSURE: 174 MMHG | HEIGHT: 60 IN | TEMPERATURE: 98.8 F | DIASTOLIC BLOOD PRESSURE: 104 MMHG

## 2018-06-28 DIAGNOSIS — E11.9 TYPE 2 DIABETES MELLITUS WITHOUT COMPLICATION, WITHOUT LONG-TERM CURRENT USE OF INSULIN (HCC): ICD-10-CM

## 2018-06-28 DIAGNOSIS — R30.0 BURNING WITH URINATION: Primary | ICD-10-CM

## 2018-06-28 DIAGNOSIS — I10 ESSENTIAL HYPERTENSION: ICD-10-CM

## 2018-06-28 DIAGNOSIS — R30.0 BURNING WITH URINATION: ICD-10-CM

## 2018-06-28 LAB
ALBUMIN SERPL-MCNC: 3.7 G/DL (ref 3.5–5)
ALBUMIN/GLOB SERPL: 0.9 {RATIO} (ref 1.1–2.2)
ALP SERPL-CCNC: 139 U/L (ref 45–117)
ALT SERPL-CCNC: 56 U/L (ref 12–78)
ANION GAP SERPL CALC-SCNC: 8 MMOL/L (ref 5–15)
AST SERPL-CCNC: 40 U/L (ref 15–37)
BILIRUB SERPL-MCNC: 0.2 MG/DL (ref 0.2–1)
BILIRUB UR QL STRIP: NEGATIVE
BUN SERPL-MCNC: 9 MG/DL (ref 6–20)
BUN/CREAT SERPL: 14 (ref 12–20)
CALCIUM SERPL-MCNC: 9.1 MG/DL (ref 8.5–10.1)
CHLORIDE SERPL-SCNC: 108 MMOL/L (ref 97–108)
CO2 SERPL-SCNC: 28 MMOL/L (ref 21–32)
CREAT SERPL-MCNC: 0.64 MG/DL (ref 0.55–1.02)
ERYTHROCYTE [DISTWIDTH] IN BLOOD BY AUTOMATED COUNT: 14.7 % (ref 11.5–14.5)
EST. AVERAGE GLUCOSE BLD GHB EST-MCNC: 140 MG/DL
GLOBULIN SER CALC-MCNC: 3.9 G/DL (ref 2–4)
GLUCOSE SERPL-MCNC: 101 MG/DL (ref 65–100)
GLUCOSE UR-MCNC: NEGATIVE MG/DL
HBA1C MFR BLD: 6.5 % (ref 4.2–6.3)
HCT VFR BLD AUTO: 43 % (ref 35–47)
HGB BLD-MCNC: 14 G/DL (ref 11.5–16)
KETONES P FAST UR STRIP-MCNC: NEGATIVE MG/DL
MCH RBC QN AUTO: 30.2 PG (ref 26–34)
MCHC RBC AUTO-ENTMCNC: 32.6 G/DL (ref 30–36.5)
MCV RBC AUTO: 92.7 FL (ref 80–99)
NRBC # BLD: 0 K/UL (ref 0–0.01)
NRBC BLD-RTO: 0 PER 100 WBC
PH UR STRIP: 7 [PH] (ref 4.6–8)
PLATELET # BLD AUTO: 298 K/UL (ref 150–400)
PMV BLD AUTO: 9.8 FL (ref 8.9–12.9)
POTASSIUM SERPL-SCNC: 4.1 MMOL/L (ref 3.5–5.1)
PROT SERPL-MCNC: 7.6 G/DL (ref 6.4–8.2)
PROT UR QL STRIP: NEGATIVE
RBC # BLD AUTO: 4.64 M/UL (ref 3.8–5.2)
SODIUM SERPL-SCNC: 144 MMOL/L (ref 136–145)
SP GR UR STRIP: 1.03 (ref 1–1.03)
UA UROBILINOGEN AMB POC: NORMAL (ref 0.2–1)
URINALYSIS CLARITY POC: CLEAR
URINALYSIS COLOR POC: YELLOW
URINE BLOOD POC: NORMAL
URINE LEUKOCYTES POC: NEGATIVE
URINE NITRITES POC: NEGATIVE
WBC # BLD AUTO: 8.8 K/UL (ref 3.6–11)

## 2018-06-28 PROCEDURE — 87086 URINE CULTURE/COLONY COUNT: CPT | Performed by: NURSE PRACTITIONER

## 2018-06-28 PROCEDURE — 80053 COMPREHEN METABOLIC PANEL: CPT | Performed by: NURSE PRACTITIONER

## 2018-06-28 PROCEDURE — 87491 CHLMYD TRACH DNA AMP PROBE: CPT | Performed by: NURSE PRACTITIONER

## 2018-06-28 PROCEDURE — 83036 HEMOGLOBIN GLYCOSYLATED A1C: CPT | Performed by: NURSE PRACTITIONER

## 2018-06-28 PROCEDURE — 85027 COMPLETE CBC AUTOMATED: CPT | Performed by: NURSE PRACTITIONER

## 2018-06-28 RX ORDER — LOVASTATIN 40 MG/1
40 TABLET ORAL
Qty: 90 TAB | Refills: 3 | Status: SHIPPED | OUTPATIENT
Start: 2018-06-28 | End: 2019-07-18 | Stop reason: SDUPTHER

## 2018-06-28 RX ORDER — LISINOPRIL AND HYDROCHLOROTHIAZIDE 20; 25 MG/1; MG/1
1 TABLET ORAL DAILY
Qty: 90 TAB | Refills: 1 | Status: SHIPPED | OUTPATIENT
Start: 2018-06-28 | End: 2019-07-12 | Stop reason: SDUPTHER

## 2018-06-28 RX ORDER — METFORMIN HYDROCHLORIDE 500 MG/1
500 TABLET, EXTENDED RELEASE ORAL
Qty: 90 TAB | Refills: 3 | Status: SHIPPED | OUTPATIENT
Start: 2018-06-28 | End: 2019-07-18 | Stop reason: SDUPTHER

## 2018-06-28 NOTE — MR AVS SNAPSHOT
[address] [place] [address] [address] [facility] 
[phone] Patient: Zeynep Carcamo MRN: [id number] :1970 Visit Information Nimo Freed y Óscar Personal Médico Departamento Teléfono del Dep. Número de visita 2018 11:30 AM Kristyn Quinonez NP [address] [phone] Upcoming Health Maintenance Date Due DTaP/Tdap/Td series (1 - Tdap) 1991 PAP AKA CERVICAL CYTOLOGY 2016 Influenza Age 5 to Adult 2018 Alergias  Review Complete El: 2018 Por: Kristyn Quinonez NP  
 Ariel Ra del:  2018 No Known Allergies Vacunas actuales Debra Ellis Vimal Influenza Vaccine (Quad) PF 11/3/2016 No revisadas esta visita You Were Diagnosed With   
  
 [place] Perish Burning with urination    -  Primary ICD-10-CM: R30.0 ICD-9-CM: 788.1 Essential hypertension     ICD-10-CM: I10 
ICD-9-CM: 401.9 Type 2 diabetes mellitus without complication, without long-term current use of insulin (HCC)     ICD-10-CM: E11.9 ICD-9-CM: 250.00 Partes vitales PS Pulso Temperatura Wilson ( percentil de crecimiento) Peso (percentil de crecimiento) BMI ([facility])  
 (!) 174/104 (BP 1 Location: Left arm, BP Patient Position: Sitting) 66 98.8 °F (37.1 °C) (Oral) 4' 11.94\" (1.522 m) 184 lb (83.5 kg) 36.01 kg/m2 Estado obstétrico Estatus de tabaquísmo Menopause Former Smoker Historial de signos vitales BMI and BSA Data Body Mass Index Body Surface Area 36.01 kg/m 2 1.88 m 2  [facility] Name Phone [address] [place] [address], [address] [phone] Pearson lista de medicamentos actualizada Terence Maxon actualizada 18  1:59 PM.  Mckenzie Rumps use pearson lista de medicamentos más reciente. lisinopril-hydroCHLOROthiazide 20-25 mg per tablet También conocido veronika:  Woodrow Lash Take 1 Tab by mouth daily. [place] 1 tableta por boca diario.  
  
 lovastatin 40 mg tablet También conocido veronika:  MEVACOR Take 1 Tab by mouth nightly. [place] 1 tableta por la boca cada noche.  
  
 metFORMIN  mg tablet También conocido veronika:  GLUCOPHAGE XR Take 1 Tab by mouth daily (with dinner). [place] 1 tableta por boca con dee. Recetas Enviado a la [place] Refills  
 lisinopril-hydroCHLOROthiazide (PRINZIDE, ZESTORETIC) 20-25 mg per tablet 1 Sig: Take 1 Tab by mouth daily. [place] 1 tableta por boca diario. Class: Normal  
 Pharmacy: [facility] [address] ANDREIA VUONG [facility], [address] Ph #: [phone] Route: Oral  
 lovastatin (MEVACOR) 40 mg tablet 3 Sig: Take 1 Tab by mouth nightly. [place] 1 tableta por la boca cada noche. Class: Normal  
 Pharmacy: [facility] [address] ANDREIA VUONG [facility], [address] Ph #: [phone] Route: Oral  
 metFORMIN ER (GLUCOPHAGE XR) 500 mg tablet 3 Sig: Take 1 Tab by mouth daily (with dinner). [place] 1 tableta por boca con dee. Class: Normal  
 Pharmacy: [facility] DR ANDREIA VUONG [facility], [address] Ph #: [phone] Route: Oral  
  
Hicimos lo siguiente AMB POC URINALYSIS DIP STICK AUTO W/O MICRO [53197 CPT(R)] Por hacer 06/28/2018 Lab:  CBC W/O DIFF   
  
 06/28/2018 Lab:  CHLAMYDIA/GC PCR   
  
 06/28/2018 Microbiology:  CULTURE, URINE   
  
 06/28/2018 Lab:  HEMOGLOBIN A1C WITH EAG   
  
 06/28/2018 Lab:  METABOLIC PANEL, COMPREHENSIVE Instrucciones para el Paciente Dolor al Nancy Rasmussenan (disuria): Instrucciones de cuidado - [ Painful Urination (Dysuria): Care Instructions ] Instrucciones de cuidado Sentir ardor al orinar (disuria) es un síntoma común de josh infección de las vías urinarias u otros problemas urinarios. La vejiga puede inflamarse. [place] puede causar dolor al llenarse o vaciarse la vejiga. Usted también puede sentir dolor si el conducto que transporta la orina desde la vejiga hacia afuera del organismo (uretra) se irrita o se infecta. Las infecciones de transmisión sexual (STI, por noel siglas en inglés) también pueden causar dolor al orinar. A veces, el dolor puede ser causado por otras cosas además de josh infección. La uretra puede irritarse a causa de jabones, perfumes u objetos extraños en la uretra. Los cálculos renales pueden causar dolor cuando pasan por la uretra. Puede ser difícil encontrar la causa. Es posible que usted deba hacerse pruebas. El tratamiento para el dolor al orinar depende de la causa. La atención de seguimiento es josh parte clave de lanier tratamiento y seguridad. Asegúrese de hacer y acudir a todas las citas, y llame a lanier médico si está teniendo problemas. También es josh buena idea saber los resultados de los exámenes y mantener josh lista de los medicamentos que janet. Cómo puede cuidarse en el hogar? · [employer] agua tarik los siguientes arleth o [address] [place] ayudará a que la orina sea menos concentrada. (Si tiene enfermedad de los riñones, el corazón o el hígado y tiene que Bradly's líquidos, hable con lanier médico antes de aumentar la cantidad de líquidos que lexis). · Evite las bebidas gaseosas o con cafeína. Pueden irritar la vejiga. · Orine con frecuencia. Trate de vaciar la vejiga cada vez que orine. Para las mujeres: · Orine inmediatamente después de shelby tenido [employer]. · Después de ir al baño, límpiese de adelante hacia atrás. · Evite el uso de duchas vaginales, los rosalia de burbujas y los [address] de higiene femenina. Y evite otros productos para la higiene femenina que contengan desodorantes. Cuándo debe pedir ayuda? Llame a lanier médico ahora mismo o busque atención médica inmediata si: 
? · Tiene síntomas nuevos veronika fiebre, náuseas o vómito. ? · Tiene síntomas nuevos o peores de un problema urinario. Por ejemplo: ¨ Tiene dotty o pus en la orina. ¨ Tiene escalofríos o le duele el cuerpo. ¨ Siente más dolor al [place]. ¨ Tiene dolor en la jessica o el abdomen. ¨ Tiene dolor de espalda miracle debajo de la caja torácica (el flanco). ? Vigile muy de cerca los cambios en lanier darlene, y asegúrese de comunicarse con lanier médico si tiene algún problema. Dónde puede encontrar más información en inglés? Vinicio Gomez a http://shahab-юлия.info/. Brenton Pepe R842 en la búsqueda para aprender más acerca de \"Dolor al [place] (disuria): Instrucciones de cuidado - [ Painful Urination (Dysuria): Care Instructions ]. \" 
Revisado: [employer], 2017 Versión del contenido: 11.4 © 1077-3219 Healthwise, Incorporated. Las instrucciones de cuidado fueron adaptadas bajo licencia por Good P[employer] Connections (which disclaims liability or warranty for this information). Si usted tiene [place] afección médica o sobre estas instrucciones, siempre pregunte a lanier profesional de darlene. Healthwise, Incorporated niega toda garantía o responsabilidad por lanier uso de esta información. Introducing [facility]! [facility] introduce portal paciente Heather . Ahora se puede acceder a partes de lanier expediente médico, enviar por correo electrónico la oficina de lanier médico y solicitar renovaciones de medicamentos en línea. En lanier navegador de Internet , Juli Corley a [email or web address]://[email or web address]. [employer]. [employer] Key[employer] Soni vince en el usuario por Lenka Marquez? Urban Luther clic aquí en la sesión Bela Callaway. Verá la página de registro [place]. Ingrese lanier código de [facility] Carie paula y veronika aparece a continuación. Usted no tendrá que [employer] código después de shelby completado el proceso de registro . Si usted no se inscribe antes de la fecha de caducidad , debe solicitar un nuevo código. · MyChart Código de acceso : [id number] Expires: 9/26/2018  1:59 PM 
 
Ingresa los últimos cuatro dígitos de lanier Número de Seguro Social ( xxxx ) y fecha de nacimiento ( dd / mm / aaaa ) veronika se indica y soni clic en Enviar. Usted será llevado a la siguiente página de registro . Crear un ID MyChart . Esta será lanier ID de inicio de sesión de MyChart y no puede ser [place] , por lo que pensar en josh que es [place] y fácil de recordar . Crear josh contraseña MyChart . Usted puede cambiar lanier contraseña en cualquier momento . Ingrese lanier Password Reset de preguntas y [place] . [place] se puede utilizar en un momento posterior si usted olvida lanier contraseña. Introduzca lanier dirección de correo electrónico . Keisha Knows recibirá josh notificación por correo electrónico cuando la nueva información está disponible en MyChart . Burnis Teresa clic en Registrarse. Patsy Fuelling zach y descargar porciones de lanier expediente médico. 
Soni clic en el enlace de descarga del menú Resumen para descargar josh copia portátil de lanier información médica . Si tiene [employer] , por favor visite la sección de preguntas frecuentes del sitio web MyChart . Recuerde, MyChart NO es que se utilizará para las necesidades urgentes. Para emergencias médicas , llame al 911 . Ahora disponible en lanier iPhone y Android ! Por favor proporcione ana m resumen de la documentación de cuidado a lanier próximo proveedor. Your primary care clinician is listed as NONE. If you have any questions after today's visit, please call [phone].

## 2018-06-28 NOTE — PROGRESS NOTES
Subjective:     Chief Complaint   Patient presents with    Hypertension     medications refill, been off meds x 1 wk,         She  is a 52 y.o. female who presents for evaluation of DM/HTN and c/o of dysuria. Dysuria onset approx 1 week ago w/ c/o of dysuria (internal pain), only occurs during urination. No assoc fevers/chills, malodor, clarity changes, vaginal discharge nor visible blood. Notes chronic Hx of similar S&S, 3x year for the last 5-6 years. No attempted remedy. No OCP/BC used, Pt is surg sterilized in 2003. Does not check glucoses nor BP at home. Mild frontal h/a since being off Rx, no assoc CP, vision changes nor dizziness. ROS  Gen - no fever/chills  Resp - no dyspnea or cough  CV - no chest pain or GRIFFIN  Rest per HPI    Past Medical History:   Diagnosis Date    Abnormal menses 4/29/2013    Dysmenorrhea 4/29/2013    Headache(784.0) 4/29/2013    Hx of mammogram no hx    Marital conflict involving divorce 4/29/2013    Other and unspecified hyperlipidemia 4/29/2013    Pap smear for cervical cancer screening 07/2011    Pap smear for cervical cancer screening 2011    Pap smear for cervical cancer screening unknown    Pelvic pain in female 4/29/2013    Thrombasthenia ([address] 4/29/2013    Unspecified essential hypertension 4/29/2013    Urinary tract infection, site not specified 4/29/2013     Past Surgical History:   Procedure Laterality Date    HX TUBAL LIGATION       Current Outpatient Prescriptions on File Prior to Visit   Medication Sig Dispense Refill    lovastatin (MEVACOR) 40 mg tablet Take 1 Tab by mouth nightly. Instead of the 20mg (Serbian). 90 Tab 1    metFORMIN ER (GLUCOPHAGE XR) 500 mg tablet Take 1 Tab by mouth daily (with dinner). 90 Tab 1    lisinopril-hydroCHLOROthiazide (PRINZIDE, ZESTORETIC) 20-25 mg per tablet Take 1 Tab by mouth daily. 90 Tab 1     No current facility-administered medications on file prior to visit.          Objective: Vitals:    06/28/18 1212 06/28/18 1216   BP: (!) 189/107 (!) 174/104   Pulse: 60 66   Temp: 98.8 °F (37.1 °C)    TempSrc: Oral    Weight: 184 lb (83.5 kg)    Height: 4' 11.94\" (1.522 m)        Physical Examination:  General appearance - alert, well appearing, and in no distress  Eyes -sclera anicteric  Neck - supple, no significant adenopathy, no thyromegaly  Chest - clear to auscultation, no wheezes, rales or rhonchi, symmetric air entry  Heart - normal rate, regular rhythm, normal S1, S2, no murmurs, rubs, clicks or gallops  Neurological - alert, oriented, no focal findings or movement disorder noted  Abdomen-BS present/WNL x 4 quads, non-tender/distended, soft,no organomegaly    Recent Results (from the past 12 hour(s))   AMB POC URINALYSIS DIP STICK AUTO W/O MICRO    Collection Time: 06/28/18 12:33 PM   Result Value Ref Range    Color (UA POC) Yellow     Clarity (UA POC) Clear     Glucose (UA POC) Negative Negative    Bilirubin (UA POC) Negative Negative    Ketones (UA POC) Negative Negative    Specific gravity (UA POC) 1.030 1.001 - 1.035    Blood (UA POC) Trace Negative    pH (UA POC) 7.0 4.6 - 8.0    Protein (UA POC) Negative Negative    Urobilinogen (UA POC) 0.2 mg/dL 0.2 - 1    Nitrites (UA POC) Negative Negative    Leukocyte esterase (UA POC) Negative Negative         Assessment/ Plan:   Diagnoses and all orders for this visit:    1. Burning with urination  -     AMB POC URINALYSIS DIP STICK AUTO W/O MICRO  -     CHLAMYDIA/GC PCR; Future  -     CULTURE, URINE; Future    2. Essential hypertension  -     METABOLIC PANEL, COMPREHENSIVE; Future  -     HEMOGLOBIN A1C WITH EAG; Future  -     CBC W/O DIFF; Future  -     lisinopril-hydroCHLOROthiazide (PRINZIDE, ZESTORETIC) 20-25 mg per tablet; Take 1 Tab by mouth daily. [place] 1 tableta por boca diario. -     lovastatin (MEVACOR) 40 mg tablet; Take 1 Tab by mouth nightly. [place] 1 tableta por la boca cada noche.     3. Type 2 diabetes mellitus without complication, without long-term current use of insulin (HCC)  -     METABOLIC PANEL, COMPREHENSIVE; Future  -     HEMOGLOBIN A1C WITH EAG; Future  -     CBC W/O DIFF; Future  -     metFORMIN ER (GLUCOPHAGE XR) 500 mg tablet; Take 1 Tab by mouth daily (with dinner). [place] 1 tableta por brentona con dee. Review of records note chronic cystitis/mild hematuria for the last 2-3 years since Pt seen. Check urine Cx, g/c and refer to [facility] urology for further eval/recs. OTC Azo PRN x 2-3 days for dysuria while urine studies pending. Refill HTN and DM Rx, advising Pt is BP still > 150-160 after 1 week back on Rx, to call clinic for advice/sooner appt. RTC in 2-3 months after urology referral.     Discuss options [facility] pap at [facility]. I have discussed the diagnosis with the patient and the intended plan as seen in the above orders. The patient has received an after-visit summary and questions were answered concerning future plans. I have discussed medication side effects and warnings with the patient as well. The patient verbalizes understanding and agreement with the plan.     Follow-up Disposition: Not on File

## 2018-06-28 NOTE — PROGRESS NOTES
Chief Complaint   Patient presents with    Hypertension     medications refill, been off meds x 1 wk,      Coordination of Care  1. Have you been to the ER, urgent care clinic since your last visit? Hospitalized since your last visit? No    2. Have you seen or consulted any other health care providers outside of the [facility] since your last visit? Include any pap smears or colon screening. No    Does the patient need refills? YES    Learning Assessment Complete?  Yes

## 2018-06-28 NOTE — PATIENT INSTRUCTIONS
Dolor al [place] (disuria): Instrucciones de cuidado - [ Painful Urination (Dysuria): Care Instructions ]  Instrucciones de cuidado  Sentir ardor al orinar (disuria) es un síntoma común de josh infección de las vías urinarias u otros problemas urinarios. La vejiga puede inflamarse. [place] puede causar dolor al llenarse o vaciarse la vejiga. Usted también puede sentir dolor si el conducto que transporta la orina desde la vejiga hacia afuera del organismo (uretra) se irrita o se infecta. Las infecciones de transmisión sexual (STI, por noel siglas en inglés) también pueden causar dolor al orinar. A veces, el dolor puede ser causado por otras cosas además de josh infección. La uretra puede irritarse a causa de jabones, perfumes u objetos extraños en la uretra. Los cálculos renales pueden causar dolor cuando pasan por la uretra. Puede ser difícil encontrar la causa. Es posible que usted deba hacerse pruebas. El tratamiento para el dolor al orinar depende de la causa. La atención de seguimiento es josh parte clave de lanier tratamiento y seguridad. Asegúrese de hacer y acudir a todas las citas, y llame a lanier médico si está teniendo problemas. También es josh buena idea saber los resultados de los exámenes y mantener josh lista de los medicamentos que janet. ¿Cómo puede cuidarse en el hogar? · [employer] agua tarik los siguientes arleth o [address] [place] ayudará a que la orina sea menos concentrada. (Si tiene enfermedad de los riñones, el corazón o el hígado y tiene que Sterling's líquidos, hable con lanier médico antes de aumentar la cantidad de líquidos que lexis). · Evite las bebidas gaseosas o con cafeína. Pueden irritar la vejiga. · Orine con frecuencia. Trate de vaciar la vejiga cada vez que orine. Para las mujeres:  · Orine inmediatamente después de shelby tenido relaciones sexuales. · Después de ir al baño, límpiese de adelante hacia atrás.   · Evite el uso de duchas vaginales, los rosalia de burbujas y los [employer] de higiene femenina. Y evite otros productos para la higiene femenina que contengan desodorantes. ¿Cuándo debe pedir ayuda? Llame a lanier médico ahora mismo o busque atención médica inmediata si:  ? · Tiene síntomas nuevos veronika fiebre, náuseas o vómito. ? · Tiene síntomas nuevos o peores de un problema urinario. Por ejemplo:  ¨ Tiene dotty o pus en la orina. ¨ Tiene escalofríos o le duele el cuerpo. ¨ Siente más dolor al [place]. ¨ Tiene dolor en la jessica o el abdomen. ¨ Tiene dolor de espalda miracle debajo de la caja torácica (el flanco). ? Vigile muy de cerca los cambios en lanier darlene, y asegúrese de comunicarse con lanier médico si tiene algún problema. ¿Dónde puede encontrar más información en inglés? Joana Scrivener a [email or web address]-юлия.[email or web address] Virginia Hillman [id number] en la búsqueda para aprender más acerca de \"Dolor al [place] (disuria): Instrucciones de cuidado - [ Painful Urination (Dysuria): Care Instructions ]. \"  Revisado: [address] [place], 2017  Versión del contenido: 11.4  © 6051-3298 Healthwise, Incorporated. Las instrucciones de cuidado fueron adaptadas bajo licencia por Good Help Connections (which disclaims liability or warranty for this information). Si usted tiene [place] afección médica o sobre estas instrucciones, siempre pregunte a lanier profesional de darlene. Healthwise, Incorporated niega toda garantía o responsabilidad por lanier uso de esta información.

## 2018-06-28 NOTE — PROGRESS NOTES
Kenneth Goodman  Reviewed escribed rx's for Prinzide, Lovastatin and Metformin ER. Confirmed pharmacy of choice. Labs drawn today - A1C, GC/Chlamydia, UC, CMP, CBC - advised that any abnormal results will be addressed by the CBN/provider via telephone (best phone # is [phone]). Reviewed written instructions for OTC AZO given to patient by provider. AVS printed, reviewed and provided to patient. Advised to return to registration to schedule 2 appointments: 1) follow up appointment with provider in 2-3 months and 2) Urology specialist appointment with Dr. Ousmane Leon (at [facility]). Communicated with patient via , tSeve Bella. Expressed understanding of above stated items and had no further questions. Simona Santiago RN

## 2018-06-29 LAB
C TRACH DNA SPEC QL NAA+PROBE: NEGATIVE
N GONORRHOEA DNA SPEC QL NAA+PROBE: NEGATIVE
SAMPLE TYPE: NORMAL
SERVICE CMNT-IMP: NORMAL
SPECIMEN SOURCE: NORMAL

## 2018-06-30 LAB
BACTERIA SPEC CULT: NORMAL
CC UR VC: NORMAL
SERVICE CMNT-IMP: NORMAL

## 2018-07-12 ENCOUNTER — OFFICE VISIT (OUTPATIENT)
Dept: FAMILY MEDICINE CLINIC | Age: 48
End: 2018-07-12

## 2018-07-12 VITALS
HEART RATE: 73 BPM | TEMPERATURE: 99 F | BODY MASS INDEX: 35.42 KG/M2 | DIASTOLIC BLOOD PRESSURE: 84 MMHG | WEIGHT: 181 LBS | SYSTOLIC BLOOD PRESSURE: 136 MMHG

## 2018-07-12 DIAGNOSIS — R30.0 BURNING WITH URINATION: Primary | ICD-10-CM

## 2018-07-12 RX ORDER — CEPHALEXIN 250 MG/1
250 CAPSULE ORAL 4 TIMES DAILY
Qty: 40 CAP | Refills: 0 | Status: SHIPPED | OUTPATIENT
Start: 2018-07-12 | End: 2018-07-22

## 2018-07-12 NOTE — PROGRESS NOTES
53 yo female with a history of diabetes mellitus. Recurrent UTI- every 3 months. She experiences postvoid pain, burning, no hematuria or flank pain or fever. She has some incontinence with infections. She is not very sexually active and episodes are not related to infection. She treats her symptoms by increased fluids and it goes away on its own. She  C section and no stress incontinence. She cannot relate to any foods or activity that causes her symptoms. Her UA and cultures are negative. No exam is needed. Plan: A trial of Keflex 250mg to take one when she has an episode. She probably clears her UTI with fluids when these episodes occur. Return prn.

## 2019-07-02 DIAGNOSIS — I10 ESSENTIAL HYPERTENSION: ICD-10-CM

## 2019-07-02 RX ORDER — LISINOPRIL AND HYDROCHLOROTHIAZIDE 20; 25 MG/1; MG/1
1 TABLET ORAL DAILY
Qty: 90 TAB | Refills: 1 | OUTPATIENT
Start: 2019-07-02

## 2019-07-02 NOTE — TELEPHONE ENCOUNTER
Incoming fax received requesting refill for Lisinopril/HCTZ. Pt last seen for regular office visit on 6/28/18. Refill denied with note that pt must be seen by provider for additional refills to be authorized.   Luis Guerrero RN

## 2019-07-12 ENCOUNTER — HOSPITAL ENCOUNTER (OUTPATIENT)
Dept: LAB | Age: 49
Discharge: HOME | End: 2019-07-12

## 2019-07-12 ENCOUNTER — HOSPITAL ENCOUNTER (OUTPATIENT)
Dept: MAMMOGRAPHY | Age: 49
Discharge: HOME | End: 2019-07-12

## 2019-07-12 ENCOUNTER — OFFICE VISIT (OUTPATIENT)
Dept: FAMILY PLANNING/WOMEN'S HEALTH CLINIC | Age: 49
End: 2019-07-12

## 2019-07-12 VITALS — DIASTOLIC BLOOD PRESSURE: 106 MMHG | SYSTOLIC BLOOD PRESSURE: 179 MMHG

## 2019-07-12 DIAGNOSIS — Z12.31 VISIT FOR SCREENING MAMMOGRAM: ICD-10-CM

## 2019-07-12 DIAGNOSIS — Z01.419 WELL WOMAN EXAM WITH ROUTINE GYNECOLOGICAL EXAM: Primary | ICD-10-CM

## 2019-07-12 DIAGNOSIS — I10 ESSENTIAL HYPERTENSION: ICD-10-CM

## 2019-07-12 PROCEDURE — 88175 CYTOPATH C/V AUTO FLUID REDO: CPT

## 2019-07-12 PROCEDURE — 77067 SCR MAMMO BI INCL CAD: CPT

## 2019-07-12 RX ORDER — LISINOPRIL AND HYDROCHLOROTHIAZIDE 20; 25 MG/1; MG/1
1 TABLET ORAL DAILY
Qty: 90 TAB | Refills: 0 | Status: SHIPPED | OUTPATIENT
Start: 2019-07-12 | End: 2019-07-18 | Stop reason: SDUPTHER

## 2019-07-12 NOTE — PROGRESS NOTES
Assessment/Plan:    Diagnoses and all orders for this visit:    1. Well woman exam with routine gynecological exam  -     PAP IG, RFX APTIMA HPV ASCUS ([id number]))    2. Essential hypertension  -     lisinopril-hydroCHLOROthiazide (PRINZIDE, ZESTORETIC) 20-25 mg per tablet; Take 1 Tab by mouth daily. [place] 1 tableta por magali caruso. Months off her meds- last visit > 1 year ago. I have called in 90 days of bp med but she needs to be seen for the DM and chol medication refills. She was given a calendar for care a Priti jacobson, has tried recently 2 times to get into clinics on , I have recommended she try to go on a less busy day. .. TIMMY Busby expressed understanding of this plan. An AVS was printed and given to the patient.      ----------------------------------------------------------------------    Chief Complaint   Patient presents with    Well Woman     EWL visit       History of Present Illness:  [facility] well woman exam. bp noted to be high. No chest pain or SOB. Off lis/hctz for \"long time\". States that she \"needs refills\"  No change in gyn health recently. Last pap in  she thinks.      Menopause \"5 years ago\"  , no DV  c-sections times 2      Past Medical History:   Diagnosis Date    Abnormal menses 2013    Dysmenorrhea 2013    Headache(784.0) 2013    Hx of mammogram no hx    Marital conflict involving divorce 2013    Other and unspecified hyperlipidemia 2013    Pap smear for cervical cancer screening 2011    Pap smear for cervical cancer screening     Pap smear for cervical cancer screening unknown    Pelvic pain in female 2013    Thrombasthenia ([address] 2013    Unspecified essential hypertension 2013    Urinary tract infection, site not specified 2013       Current Outpatient Medications   Medication Sig Dispense Refill    lisinopril-hydroCHLOROthiazide (PRINZIDE, ZESTORETIC) 20-25 mg per tablet Take 1 Tab by mouth daily. [place] 1 tableta por boca diario. 90 Tab 0    lovastatin (MEVACOR) 40 mg tablet Take 1 Tab by mouth nightly. [place] 1 tableta por la boca cada noche. 90 Tab 3    metFORMIN ER (GLUCOPHAGE XR) 500 mg tablet Take 1 Tab by mouth daily (with dinner). [place] 1 tableta por boca con dee. 90 Tab 3       No Known Allergies    Social History     Tobacco Use    Smoking status: Former Smoker     Types: Cigarettes     Last attempt to quit: 2013     Years since quittin.0    Smokeless tobacco: Never Used    Tobacco comment: social every other week   Substance Use Topics    Alcohol use: No     Alcohol/week: 0.5 oz     Types: 1 Cans of beer per week    Drug use: No       No family history on file. Physical Exam:     Visit Vitals  BP (!) 179/106   LMP 2015       A&Ox3  WDWN NAD  Respirations normal and non labored  Breast exam- maxwell neg for mass, tenderness, skin color changes, dimpling, retractions  Pelvic exam- ext neg for lesion or discharge. Cervix nulliparous.  Uterus and adnexal exam no masses felt, she has large adipose abdomen which makes exam difficult  She declines rectal exam

## 2019-07-12 NOTE — PROGRESS NOTES
EVERY WOMANS LIFE HISTORY QUESTIONNAIRE       No Yes Comments   Has a doctor ever seen or felt anything wrong with your breast? [x]                                  []                                     Have you ever had a breast biopsy? [x]                                  []                                          When and where was last mammogram performed? 4/2018     Have you ever been told that there was a problem on your mammogram?   No Yes Comments   [x]                                  []                                       Do you have breast implants? No Yes Comments   [x]                                  []                                       When was your last Pap test performed? 9/2013 at [facility] you ever had an abnormal Pap test?   No Yes Comments   [x]                                  []                                       Have you had a hysterectomy? No Yes Comments (why)   [x]                                  []                                       Have you been through menopause? No Yes Date of LMP   []                                  [x]                                  About 5 yrs ago     Did your mother take MELANIA? No Yes Unknown   []                                  []                                  X     Do you have a history of HIV exposure? No Yes    [x]                                  []                                       Have you ever been diagnosed with any type of Cancer   No Yes Comments (type,when,where,type of treatment   [x]                                  []                                          Has a family member been diagnosed with breast or ovarian cancer?    No Yes Comments (which family members, and type   []                                  [x]                                  Maternal aunt with breast cancer at 61 yrs     Are you taking hormone replacement therapy (HRT)     No Yes Comments   [x]                                  [] How many times have you been pregnant? 2        Number of live births ? 2    Are you experiencing any of the following? No Yes Comments   Nipple Discharge [x]                                  []                                     Breast Lump/Masses [x]                                  []                                     Breast Skin Changes [x]                                  []                                          No Yes Comments   Vaginal Discharge [x]                                  []                                     Abnormal/unusual vaginal bleeding [x]                                  []                                         Are you experiencing any other health problems? HTN, diabetes, high chol---followed at [facility] but hasn't been able to get in the last 2 weeks    Out of meds and BP elevated today        Age at first period?   25  Age at first birth? 25    Ht--4' 11\"    Wt--180 #

## 2019-07-18 ENCOUNTER — HOSPITAL ENCOUNTER (OUTPATIENT)
Dept: LAB | Age: 49
Discharge: HOME | End: 2019-07-18

## 2019-07-18 ENCOUNTER — OFFICE VISIT (OUTPATIENT)
Dept: FAMILY MEDICINE CLINIC | Age: 49
End: 2019-07-18

## 2019-07-18 VITALS
BODY MASS INDEX: 37.29 KG/M2 | TEMPERATURE: 97.7 F | DIASTOLIC BLOOD PRESSURE: 88 MMHG | SYSTOLIC BLOOD PRESSURE: 137 MMHG | HEART RATE: 69 BPM | HEIGHT: 59 IN | WEIGHT: 185 LBS

## 2019-07-18 DIAGNOSIS — E66.01 SEVERE OBESITY (HCC): ICD-10-CM

## 2019-07-18 DIAGNOSIS — Z23 ENCOUNTER FOR IMMUNIZATION: Primary | ICD-10-CM

## 2019-07-18 DIAGNOSIS — E11.9 TYPE 2 DIABETES MELLITUS WITHOUT COMPLICATION, WITHOUT LONG-TERM CURRENT USE OF INSULIN (HCC): ICD-10-CM

## 2019-07-18 DIAGNOSIS — I10 ESSENTIAL HYPERTENSION: ICD-10-CM

## 2019-07-18 PROCEDURE — 83036 HEMOGLOBIN GLYCOSYLATED A1C: CPT

## 2019-07-18 RX ORDER — LISINOPRIL AND HYDROCHLOROTHIAZIDE 20; 25 MG/1; MG/1
1 TABLET ORAL DAILY
Qty: 90 TAB | Refills: 1
Start: 2019-07-18 | End: 2019-11-14 | Stop reason: SDUPTHER

## 2019-07-18 RX ORDER — LOVASTATIN 40 MG/1
40 TABLET ORAL
Qty: 90 TAB | Refills: 3 | Status: SHIPPED | OUTPATIENT
Start: 2019-07-18 | End: 2020-03-27 | Stop reason: SDUPTHER

## 2019-07-18 RX ORDER — METFORMIN HYDROCHLORIDE 500 MG/1
500 TABLET, EXTENDED RELEASE ORAL
Qty: 90 TAB | Refills: 3 | Status: SHIPPED | OUTPATIENT
Start: 2019-07-18 | End: 2020-03-27 | Stop reason: SDUPTHER

## 2019-07-18 NOTE — PATIENT INSTRUCTIONS
Cómo comenzar un plan para bajar de peso: Instrucciones de cuidado - [ Starting a Weight Loss Plan: Care Instructions ]  Instrucciones de cuidado    Si está pensando en adelgazar, puede que le resulte difícil saber por dónde empezar. Lanier médico puede ayudarle a preparar un plan para bajar de peso que se ajuste mejor a noel necesidades. Es posible que prefiera tyesha josh clase de nutrición o ejercicio, o unirse a un gopi de [de-identified] para adelgazar. Si tiene preguntas sobre cómo cambiar noel hábitos alimenticios o rutina de ejercicio, pregúntele a lanier médico sobre la posibilidad de consultar a un dietista registrado o a un especialista en ejercicio. Bajar de peso puede ser un gran desafío. No tiene que hacer grandes cambios de repente. Christ Hernandez y Klaudia. Cuando esos cambios se conviertan en un hábito, incorpore algunos [employer]. Si yuko que no está listo para hacer cambios en ana m momento, escoja josh fecha en el futuro. Soni josh le con lanier médico para determinar si es apropiado que comience un plan para bajar de [place]. La atención de seguimiento es josh parte clave de lanier tratamiento y seguridad. Asegúrese de hacer y acudir a todas las citas, y llame a lanier médico si está teniendo problemas. También es josh buena idea saber los resultados de noel exámenes y mantener josh lista de los medicamentos que janet. ¿Cómo puede cuidarse en el hogar? · Establezca metas realistas. Muchas personas esperan perder más peso del que es probable. Perder el 5% a 10% del peso corporal podría ser suficiente para mejorar lanier darlene. · Soni que lanier jaylin y noel amigos se involucren para brindarle apoyo. Hable con ellos sobre las razones por las que Sidney Freiberg y [address] Pueden ayudarle si participan en noel rutinas de ejercicio y comen con usted, aunque es posible que coman algo diferente. · Busque lo que funcione mejor para usted.  Si no tiene tiempo o no le gusta cocinar, un programa que ofrezca barras o batidos veronika sustitutos de comida podría ser el más adecuado para usted. Trav si le gusta cocinar, lo mejor puede ser un plan que incluya menús y recetas diarios. · Pregúntele a lanier médico acerca de otros profesionales de la darlene que puedan ayudarle a alcanzar noel objetivos para bajar de [place]. ? Un dietista puede ayudarle a introducir cambios saludables en lanier dieta. ? Un especialista en ejercicio o entrenador personal pueden ayudarle a desarrollar un programa de ejercicio Cesar Myke y preston. ? Un consejero o psiquiatra puede ayudarle a lidiar con la depresión, la ansiedad u otros problemas familiares que puedan interponerse en lanier propósito para adelgazar. · Considere unirse a un gopi de [de-identified] de personas que tratan de adelgazar. Lanier médico puede recomendarle grupos de apoyo en lanier localidad. ¿Dónde puede encontrar más información en inglés? Ladan Ball a http://[email or web address]-юлия.info/. Maryfrances Carrel [address] en la búsqueda para aprender más acerca de \"Cómo comenzar un plan para bajar de peso: Instrucciones de cuidado - [ Starting a Weight Loss Plan: Care Instructions ]. \"  Revisado: 25 junio, 2018  Versión del contenido: 11.9  © 8848-6545 [employer]wise, Incorporated. Las instrucciones de cuidado fueron adaptadas bajo licencia por [employer] (which disclaims liability or warranty for this information). Si usted tiene [place] afección médica o sobre estas instrucciones, siempre pregunte a lanier profesional de darlene. Healthwise, Incorporated niega toda garantía o responsabilidad por lanier uso de esta información.

## 2019-07-18 NOTE — PROGRESS NOTES
Assessment/Plan:    Diagnoses and all orders for this visit:    1. Severe obesity ([address]    2. Type 2 diabetes mellitus without complication, without long-term current use of insulin (HCC)  -     metFORMIN ER (GLUCOPHAGE XR) 500 mg tablet; Take 1 Tab by mouth daily (with dinner). [place] 1 tableta por boca con dee. -     HEMOGLOBIN A1C WITH EAG; Future    3. Essential hypertension  -     lovastatin (MEVACOR) 40 mg tablet; Take 1 Tab by mouth nightly. [place] 1 tableta por la boca cada noche.  -     lisinopril-hydroCHLOROthiazide (PRINZIDE, ZESTORETIC) 20-25 mg per tablet; Take 1 Tab by mouth daily. [place] 1 tableta por boca diario. Return in 3 months for fasting labs: lipid, TSH, cmp and a1c (she has eaten today)    Follow-up and Dispositions    · Return in about 3 months (around 10/18/2019). TIMMY Galvan expressed understanding of this plan. An AVS was printed and given to the patient.      ----------------------------------------------------------------------    Chief Complaint   Patient presents with    Medication Refill     or htn       History of Present Illness:    Pt here for f/up on DM and HTN and high xol. I saw her for her women's health exam 3 days ago and at that time refilled her bp med as she was out. She states that she has not run out of metformin but has run out of lovastatin. She denies the following: vaginal itching, blurry vision, polyuria/polydipsia, dry mouth, chest pain, SOB, GRIFFIN  She does not exercise. Walking up 10 (?) flights of stairs leaves her winded (not clear if she really has 10 flights of stairs to her apartment or 10 stairs total), but especially when she is hauling groceries up the stairs she is winded  She states that her diet is \"good\" and she avoids sweets and fried foods.  Her weight is stable  Her pap and mammo are normal and I have given her the results and she will get a copy in the mail as well      Past Medical History:   Diagnosis Date    Abnormal menses 2013    Dysmenorrhea 2013    Headache(784.0) 2013    Hx of mammogram no hx    Marital conflict involving divorce 2013    Other and unspecified hyperlipidemia 2013    Pap smear for cervical cancer screening 2011    Pap smear for cervical cancer screening     Pap smear for cervical cancer screening unknown    Pelvic pain in female 2013    Thrombasthenia ([address] 2013    Unspecified essential hypertension 2013    Urinary tract infection, site not specified 2013       Current Outpatient Medications   Medication Sig Dispense Refill    metFORMIN ER (GLUCOPHAGE XR) 500 mg tablet Take 1 Tab by mouth daily (with dinner). [place] 1 tableta por boca con dee. 90 Tab 3    lovastatin (MEVACOR) 40 mg tablet Take 1 Tab by mouth nightly. [place] 1 tableta por la boca cada noche. 90 Tab 3    lisinopril-hydroCHLOROthiazide (PRINZIDE, ZESTORETIC) 20-25 mg per tablet Take 1 Tab by mouth daily. [place] 1 tableta por boca diario.  90 Tab 1       No Known Allergies    Social History     Tobacco Use    Smoking status: Former Smoker     Types: Cigarettes     Last attempt to quit: 2013     Years since quittin.0    Smokeless tobacco: Never Used    Tobacco comment: social every other week   Substance Use Topics    Alcohol use: No     Alcohol/week: 0.8 standard drinks     Types: 1 Cans of beer per week    Drug use: No       Family History   Problem Relation Age of Onset    Breast Cancer Maternal Grandmother        Physical Exam:     Visit Vitals  /88 (BP 1 Location: Right arm, BP Patient Position: Sitting)   Pulse 69   Temp 97.7 °F (36.5 °C) (Oral)   Ht 4' 11.45\" (1.51 m)   Wt 185 lb (83.9 kg)   LMP 2015   BMI 36.80 kg/m²     gen looks well  A&Ox3  WDWN NAD  Respirations normal and non labored  Lab Results   Component Value Date/Time    Hemoglobin A1c 6.5 (H) 2018 02:06 PM     Lab Results   Component Value Date/Time    Cholesterol, total 188 09/21/2017 11:42 AM    HDL Cholesterol 58 09/21/2017 11:42 AM    LDL, calculated 113.2 (H) 09/21/2017 11:42 AM    VLDL, calculated 16.8 09/21/2017 11:42 AM    Triglyceride 84 09/21/2017 11:42 AM    CHOL/HDL Ratio 3.2 09/21/2017 11:42 AM     Lab Results   Component Value Date/Time    Sodium 144 06/28/2018 02:06 PM    Potassium 4.1 06/28/2018 02:06 PM    Chloride 108 06/28/2018 02:06 PM    CO2 28 06/28/2018 02:06 PM    Anion gap 8 06/28/2018 02:06 PM    Glucose 101 (H) 06/28/2018 02:06 PM    BUN 9 06/28/2018 02:06 PM    Creatinine 0.64 06/28/2018 02:06 PM    BUN/Creatinine ratio 14 06/28/2018 02:06 PM    GFR est AA >60 06/28/2018 02:06 PM    GFR est non-AA >60 06/28/2018 02:06 PM    Calcium 9.1 06/28/2018 02:06 PM    Bilirubin, total 0.2 06/28/2018 02:06 PM    AST (SGOT) 40 (H) 06/28/2018 02:06 PM    Alk.  phosphatase 139 (H) 06/28/2018 02:06 PM    Protein, total 7.6 06/28/2018 02:06 PM    Albumin 3.7 06/28/2018 02:06 PM    Globulin 3.9 06/28/2018 02:06 PM    A-G Ratio 0.9 (L) 06/28/2018 02:06 PM    ALT (SGPT) 56 06/28/2018 02:06 PM

## 2019-07-18 NOTE — PROGRESS NOTES
Coordination of Care  1. Have you been to the ER, urgent care clinic since your last visit? Hospitalized since your last visit? No    2. Have you seen or consulted any other health care providers outside of the [address] [place] [address] since your last visit? Include any pap smears or colon screening. No    Does the patient need refills? YES    Learning Assessment Complete?  yes

## 2019-07-18 NOTE — PROGRESS NOTES
Immunization given per provider order. Vaccine given following policy and protocol. Please see scanned consent form. VIS given. No contraindications to vaccines. Documented in [facility]. Instructions for adverse reactions discussed. Explained that if symptoms (rash, hives SOB, temperature higher of 102'f) to go to the nearest ER. Patient instructed to wait in the clinic for 15 minutes  to observe for any signs of immediate reaction. Told to tell a nurse immediately if reaction occur; if no change and felt well, it was OK to leave after 15 min. Patient expressed understanding. No adverse reaction noted at time of discharge from vaccine area. Reviewed AVS, prescription and pharmacy location with patient (metformin and lovastatin), pharmacy was updated for future prescription for patient's request, patient aware that she can request her prescriptions to be transferred from one pharmacy to another any time she desires only if she has refills or prescriptions on file. . AVS printed and given along with goodrx coupon for lovastatin. Patient aware that provider would like to follow up about today's visit in 3 months with an appt. And 1 week prior to the appt. For labs. This has been fully explained to the patient, who indicates understanding and agrees with plan. No further questions at this time.  Josias Ahmadi RN

## 2019-07-19 LAB
EST. AVERAGE GLUCOSE BLD GHB EST-MCNC: 157 MG/DL
HBA1C MFR BLD: 7.1 % (ref 4.2–6.3)

## 2019-07-19 NOTE — PROGRESS NOTES
Pt out of care for > 1 year. rx sent in yesterday. Discussed diet. She might need an increase in her dose of diabetic meds but she had not been taking them regularly probably.  Will follow, appt is scheduled

## 2019-11-11 ENCOUNTER — TELEPHONE (OUTPATIENT)
Dept: FAMILY MEDICINE CLINIC | Age: 49
End: 2019-11-11

## 2019-11-13 ENCOUNTER — TELEPHONE (OUTPATIENT)
Dept: FAMILY MEDICINE CLINIC | Age: 49
End: 2019-11-13

## 2019-11-13 NOTE — TELEPHONE ENCOUNTER
Carmen Aponte  Call main office 11/13/19 @ 3:09pm  Yes to confirmed her apt for tomorrow @ [facility] .     Thank you

## 2019-11-14 ENCOUNTER — HOSPITAL ENCOUNTER (OUTPATIENT)
Dept: LAB | Age: 49
Discharge: HOME | End: 2019-11-14

## 2019-11-14 ENCOUNTER — OFFICE VISIT (OUTPATIENT)
Dept: FAMILY MEDICINE CLINIC | Age: 49
End: 2019-11-14

## 2019-11-14 VITALS
TEMPERATURE: 98.2 F | OXYGEN SATURATION: 99 % | HEART RATE: 66 BPM | WEIGHT: 178.6 LBS | BODY MASS INDEX: 36 KG/M2 | HEIGHT: 59 IN | DIASTOLIC BLOOD PRESSURE: 95 MMHG | SYSTOLIC BLOOD PRESSURE: 148 MMHG

## 2019-11-14 DIAGNOSIS — Z71.89 COUNSELING AND COORDINATION OF CARE: Primary | ICD-10-CM

## 2019-11-14 DIAGNOSIS — Z23 ENCOUNTER FOR IMMUNIZATION: ICD-10-CM

## 2019-11-14 DIAGNOSIS — I10 ESSENTIAL HYPERTENSION: ICD-10-CM

## 2019-11-14 DIAGNOSIS — E11.9 TYPE 2 DIABETES MELLITUS WITHOUT COMPLICATION, WITHOUT LONG-TERM CURRENT USE OF INSULIN (HCC): Primary | ICD-10-CM

## 2019-11-14 DIAGNOSIS — E11.9 TYPE 2 DIABETES MELLITUS WITHOUT COMPLICATION, WITHOUT LONG-TERM CURRENT USE OF INSULIN (HCC): ICD-10-CM

## 2019-11-14 LAB
ALBUMIN SERPL-MCNC: 3.8 G/DL (ref 3.5–5)
ALBUMIN/GLOB SERPL: 1 {RATIO} (ref 1.1–2.2)
ALP SERPL-CCNC: 135 U/L (ref 45–117)
ALT SERPL-CCNC: 73 U/L (ref 12–78)
ANION GAP SERPL CALC-SCNC: 4 MMOL/L (ref 5–15)
AST SERPL-CCNC: 55 U/L (ref 15–37)
BILIRUB SERPL-MCNC: 0.3 MG/DL (ref 0.2–1)
BUN SERPL-MCNC: 10 MG/DL (ref 6–20)
BUN/CREAT SERPL: 15 (ref 12–20)
CALCIUM SERPL-MCNC: 9.3 MG/DL (ref 8.5–10.1)
CHLORIDE SERPL-SCNC: 104 MMOL/L (ref 97–108)
CHOLEST SERPL-MCNC: 143 MG/DL
CO2 SERPL-SCNC: 31 MMOL/L (ref 21–32)
CREAT SERPL-MCNC: 0.68 MG/DL (ref 0.55–1.02)
CREAT UR-MCNC: 76.8 MG/DL
EST. AVERAGE GLUCOSE BLD GHB EST-MCNC: 146 MG/DL
GLOBULIN SER CALC-MCNC: 4 G/DL (ref 2–4)
GLUCOSE POC: NORMAL MG/DL
GLUCOSE SERPL-MCNC: 111 MG/DL (ref 65–100)
HBA1C MFR BLD: 6.7 % (ref 4–5.6)
HDLC SERPL-MCNC: 42 MG/DL
HDLC SERPL: 3.4 {RATIO} (ref 0–5)
LDLC SERPL CALC-MCNC: 81.2 MG/DL (ref 0–100)
LIPID PROFILE,FLP: NORMAL
MICROALBUMIN UR-MCNC: 0.68 MG/DL
MICROALBUMIN/CREAT UR-RTO: 9 MG/G (ref 0–30)
POTASSIUM SERPL-SCNC: 3.4 MMOL/L (ref 3.5–5.1)
PROT SERPL-MCNC: 7.8 G/DL (ref 6.4–8.2)
SODIUM SERPL-SCNC: 139 MMOL/L (ref 136–145)
T4 FREE SERPL-MCNC: 1.2 NG/DL (ref 0.8–1.5)
TRIGL SERPL-MCNC: 99 MG/DL (ref ?–150)
TSH SERPL DL<=0.05 MIU/L-ACNC: 6.83 UIU/ML (ref 0.36–3.74)
VLDLC SERPL CALC-MCNC: 19.8 MG/DL

## 2019-11-14 PROCEDURE — 83036 HEMOGLOBIN GLYCOSYLATED A1C: CPT

## 2019-11-14 PROCEDURE — 80053 COMPREHEN METABOLIC PANEL: CPT

## 2019-11-14 PROCEDURE — 82043 UR ALBUMIN QUANTITATIVE: CPT

## 2019-11-14 PROCEDURE — 80061 LIPID PANEL: CPT

## 2019-11-14 PROCEDURE — 84443 ASSAY THYROID STIM HORMONE: CPT

## 2019-11-14 PROCEDURE — 84439 ASSAY OF FREE THYROXINE: CPT

## 2019-11-14 RX ORDER — LISINOPRIL AND HYDROCHLOROTHIAZIDE 20; 25 MG/1; MG/1
1 TABLET ORAL DAILY
Qty: 90 TAB | Refills: 1 | Status: SHIPPED | OUTPATIENT
Start: 2019-11-14 | End: 2019-11-22 | Stop reason: SDUPTHER

## 2019-11-14 NOTE — PROGRESS NOTES
Coordination of Care  1. Have you been to the ER, urgent care clinic since your last visit? Hospitalized since your last visit? No    2. Have you seen or consulted any other health care providers outside of the [address] [place] [address] since your last visit? Include any pap smears or colon screening. No    Does the patient need refills? YES    Learning Assessment Complete?  yes  Depression Screening complete in the past 12 months? yes  Results for orders placed or performed in visit on 11/14/19   AMB POC GLUCOSE BLOOD, BY GLUCOSE MONITORING DEVICE   Result Value Ref Range    Glucose POC fasting 122 mg/dL

## 2019-11-14 NOTE — PROGRESS NOTES
AVS printed and reviewed. E scripts discussed. Good Rx available for Atorvastatin at [facility], printed. Discussed Access Now process for eye doctor referral.  Encouraged to get flu vaccine prior to leaving clinic today. Discussion assisted By [facility] [place] , [place].

## 2019-11-14 NOTE — PATIENT INSTRUCTIONS
Aprenda acerca de la planificación de comidas para la diabetes - [ Learning About Meal Planning for Diabetes ]  ¿Por qué planificar las comidas? La planificación de comidas puede ser Shahram Hammed parte crucial del manejo de la diabetes. Planificar las comidas y los refrigerios con el equilibrio correcto de carbohidratos, proteínas y grasas puede ayudarle a mantener los niveles de azúcar en la dotty dentro de los límites ideales que estableció junto con lanier médico.  No tiene que comer alimentos especiales. Puede comer lo mismo que lanier jaylin, incluso dulces de vez en cuando. Trav debe prestar atención a la cantidad y la frecuencia con que come ciertos alimentos. Stiven vez desee colaborar con un dietista o un educador en diabetes certificado. Él o klever puede darle consejos y sugerencias de comidas y puede responder a noel preguntas sobre la planificación de comidas. Donna profesional sanitario también puede ayudarle a alcanzar un peso saludable si blue es arleth de noel objetivos. ¿Qué plan es adecuado para usted? Lanier dietista o educador en diabetes puede sugerirle que empiece con el formato de plato o el recuento de carbohidratos. Formato de plato  El formato de plato es josh manera sencilla de ayudarle a controlar la manera en que se [place]. Usted planifica noel comidas aprendiendo a zach la cantidad de espacio que cada alimento debería ocupar en un plato. Usar el formato de plato le ayuda a distribuir los carbohidratos tarik el día. Puede hacer que le resulte más fácil mantener el nivel de azúcar en la dotty dentro de los límites ideales. También le ayuda a zach si está comiendo porciones de un tamaño saludable. Para usar el formato de plato, llene la mitad del plato con verduras sin almidón. Añada carne o sustitutos de carne en un cuarto del plato. Ponga josh verdura con almidón o un grano (veronika arroz integral o josh papa) en el último cuarto del plato.  Puede agrekiley Cornell de fruta y [address] o yogur descremado o semidescremado, dependiendo de lanier meta de carbohidratos para cada comida. Estos son algunos consejos para usar el formato de plato:  · Asegúrese de no estar usando un plato demasiado rosalinda. Lo mejor es usar un plato de 9 pulgadas (23 cm). Muchos restaurantes usan platos más grandes. · Acostúmbrese a usar el formato de plato en casa. De blue modo puede luego usarlo cuando coma afuera. · Anote las preguntas que tenga acerca de usar el formato de [place]. Hable con lanier médico, dietista o educador en diabetes sobre noel inquietudes. Recuento de carbohidratos  Con el recuento de carbohidratos, usted planifica las comidas de acuerdo a la cantidad de carbohidratos en cada alimento. Los carbohidratos aumentan el nivel de azúcar en la [place] y con mayor rapidez que otros nutrientes. Se encuentran en postres, panes y cereales y en la fruta. También se encuentran en las verduras con almidón, tales veronika las octavio y [place], los granos veronika el arroz y la pasta, así veronika en la [place] y el yogur. Distribuir el consumo de carbohidratos a lo esperanza del día le ayuda a mantener el azúcar en la dotty en los límites ideales. Lanier cantidad diaria depende de varios factores, incluyendo lanier peso, nivel de [place], los [place] janet para la diabetes y los objetivos que tenga para noel niveles de azúcar en la dotty. Un dietista registrado o un educador en diabetes puede ayudarle a planear cuántos carbohidratos incluir en cada comida y refrigerio. Josh guía para la cantidad diaria de carbohidratos es:  · Entre 39 y 61 gramos en cada comida. Eso equivale a 3 o 4 porciones de carbohidratos, aproximadamente. · Entre 15 y 21 gramos para cada refrigerio. Eso equivale a 1 porción de carbohidratos, aproximadamente. La etiqueta nutricional de los alimentos envasados le indica la cantidad de carbohidratos que hay en josh porción de blue alimento. Daniel, maura cuál es el tamaño de la porción que indica la [place].  ¿Es constantino porción la cantidad que usted come de [facility] vez? Toda la información nutricional en josh etiqueta se basa en el tamaño de la porción. Así que si usted come [facility] mayor o yanet cantidad, tendrá [employer] cifras. La cantidad total de carbohidratos es lo siguiente que debe buscar en la etiqueta. Si cuenta las porciones de carbohidratos, josh porción de carbohidratos equivale a 15 gramos. Para los alimentos que no tienen etiquetas, veronika las frutas y las verduras frescas, usted necesitará josh guía que enumere la cantidad de carbohidratos que contienen esos alimentos. Pregúntele a lanier médico, dietista o educador en diabetes acerca de libros o guías de nutrición que [employer]. Si [place] insulina, necesita saber cuántos gramos de carbohidratos hay en josh comida. [place] le permite saber cuánta insulina de acción rápida necesita antes de comer. Si [place] josh bomba de [place], maira recibe [facility] cantidad richard de insulina a lo esperanza del día. Por lo tanto, debe programar la bomba en las comidas para que le suministre insulina adicional a fin de cubrir el aumento del azúcar en la dotty después de las comidas. Cuando maira sabe qué cantidad de carbohidratos consumirá, puede programar la cantidad correcta de [place]. O si [place] siempre la misma dosis de insulina, tendrá que asegurarse de consumir la misma cantidad de carbohidratos en cada comida. Si necesita ayuda adicional para comprender el recuento de carbohidratos y las etiquetas de nutrición, pregúntele a lanier médico, dietista o educador en diabetes. ¿Cómo puede empezar a usar la planificación de comidas? Estos son algunos consejos para empezar:  · Planifique las comidas para toda la semana por anticipado. No se olvide de incluir los refrigerios. · Use libros de cocina o recetas en Internet para planificar varias comidas principales. Planifique algunas comidas rápidas para las noches en las que esté ocupado.  También puede cocinar el doble de algunas comidas que se puedan congelar. Puede guardar la mitad para otras noches en las que esté ocupado y no tenga tiempo para cocinar. · Asegúrese de que tenga los ingredientes que necesita para noel recetas. Si tiene poca cantidad de algunos artículos básicos, añádalos a la lista de la compra. · Soni josh lista de alimentos que [place] para preparar desayunos, almuerzos y refrigerios. Anote cantidades abundantes de frutas y verduras. · Coloque esta lista en la nikunj del refrigerador. Siga añadiendo cosas según se le Sherie Galvez ocurriendo. · Lleve la lista consigo cuando vaya a hacer las compras semanales. La atención de seguimiento es josh parte clave de lanier tratamiento y seguridad. Asegúrese de hacer y acudir a todas las citas, y llame a lanier médico si está teniendo problemas. También es josh buena idea saber los resultados de noel exámenes y mantener josh lista de los medicamentos que janet. ¿Dónde puede encontrar más información en inglés? Iman Hernandez a http://shahab-юлия.[email or web address] Narayan Hardwick en la búsqueda para aprender más acerca de \"Aprenda acerca de la planificación de comidas para la diabetes - [ Learning About Meal Planning for Diabetes ]. \"  Revisado: 16 ninfa, 2019  Versión del contenido: 12.2  © 0179-7044 Healthwise, Incorporated. Las instrucciones de cuidado fueron adaptadas bajo licencia por [employer] (which disclaims liability or warranty for this information). Si usted tiene [place] afección médica o sobre estas instrucciones, siempre pregunte a lanier profesional de darlene. Healthwise, Incorporated niega toda garantía o responsabilidad por lanier uso de esta información.

## 2019-11-14 NOTE — PROGRESS NOTES
[place] [facility]  Requests flu vaccine. Denies fever and egg allergy. VIS information sheet given to patient. Explained possible s/e. Reviewed s/sx indicating need to be seen in ER. Patient had no adverse reaction at time of discharge. Entered into VIIS. GIVEN BY ECTOR BRIDGES LPN.  Sophie Stark RN

## 2019-11-15 NOTE — PROGRESS NOTES
Assessment/Plan:    Diagnoses and all orders for this visit:    1. Type 2 diabetes mellitus without complication, without long-term current use of insulin (HCC)  -     AMB POC GLUCOSE BLOOD, BY GLUCOSE MONITORING DEVICE  -     MICROALBUMIN, UR, RAND W/ MICROALB/CREAT RATIO; Future  -     LIPID PANEL; Future  -     METABOLIC PANEL, COMPREHENSIVE; Future  -     HEMOGLOBIN A1C WITH EAG; Future  -     TSH 3RD GENERATION; Future  -     T4, FREE; Future  -     REFERRAL TO OPHTHALMOLOGY    2. Essential hypertension  -     lisinopril-hydroCHLOROthiazide (PRINZIDE, ZESTORETIC) 20-25 mg per tablet; Take 1 Tab by mouth daily. [place] 1 tableta por boca diario. 3. Encounter for immunization  -     INFLUENZA VIRUS VAC QUAD,SPLIT,PRESV FREE SYRINGE IM    Addendum: LFT's elevated. Will need to f/up with imaging + hepatitis labs. Pt to be informed     Follow-up and Dispositions    · Return in about 3 months (around 2/14/2020). TIMMY Herzog expressed understanding of this plan. An AVS was printed and given to the patient.      ----------------------------------------------------------------------    Chief Complaint   Patient presents with    Diabetes     f/u, med rfill    Eye Problem     pt c/o blurry vision, and teary eyes. Pt would like to be referred to eye Dr. Wilks Immunization/Injection       History of Present Illness:  Pt here for scheduled f/up on DM. She states that her only concern is that her vision is affected by teary eyes and she is required to wear readers all the time now. She has not been seen for her diabetic eye exam for years. She does not check her blood sugar.  She denies polyuria/polydipsia/unexplained weight loss/ vaginal itching  She is taking her meds as directed        Past Medical History:   Diagnosis Date    Abnormal menses 4/29/2013    Dysmenorrhea 4/29/2013    Headache(784.0) 4/29/2013    Hx of mammogram no hx    Marital conflict involving divorce 4/29/2013  Other and unspecified hyperlipidemia 2013    Pap smear for cervical cancer screening 2011    Pap smear for cervical cancer screening     Pap smear for cervical cancer screening unknown    Pelvic pain in female 2013    Thrombasthenia ([address] 2013    Unspecified essential hypertension 2013    Urinary tract infection, site not specified 2013       Current Outpatient Medications   Medication Sig Dispense Refill    lisinopril-hydroCHLOROthiazide (PRINZIDE, ZESTORETIC) 20-25 mg per tablet Take 1 Tab by mouth daily. [place] 1 tableta por boca diario. 90 Tab 1    metFORMIN ER (GLUCOPHAGE XR) 500 mg tablet Take 1 Tab by mouth daily (with dinner). [place] 1 tableta por boca con dee. 90 Tab 3    lovastatin (MEVACOR) 40 mg tablet Take 1 Tab by mouth nightly. [place] 1 tableta por la boca cada noche.  90 Tab 3       No Known Allergies    Social History     Tobacco Use    Smoking status: Former Smoker     Types: Cigarettes     Last attempt to quit: 2013     Years since quittin.3    Smokeless tobacco: Never Used    Tobacco comment: social every other week   Substance Use Topics    Alcohol use: No     Alcohol/week: 0.8 standard drinks     Types: 1 Cans of beer per week    Drug use: No       Family History   Problem Relation Age of Onset    Breast Cancer Maternal Grandmother        Physical Exam:     Visit Vitals  BP (!) 148/95 (BP 1 Location: Right arm, BP Patient Position: Sitting)   Pulse 66   Temp 98.2 °F (36.8 °C) (Temporal)   Ht 4' 11.45\" (1.51 m)   Wt 178 lb 9.6 oz (81 kg)   LMP 2015   SpO2 99%   BMI 35.53 kg/m²       A&Ox3  WDWN NAD  Respirations normal and non labored  Lab Results   Component Value Date/Time    Hemoglobin A1c 6.7 (H) 2019 10:07 AM     Lab Results   Component Value Date/Time    Sodium 139 2019 10:07 AM    Potassium 3.4 (L) 2019 10:07 AM    Chloride 104 2019 10:07 AM    CO2 31 2019 10:07 AM    Anion gap 4 (L) 2019 10:07 AM    Glucose 111 (H) 11/14/2019 10:07 AM    BUN 10 11/14/2019 10:07 AM    Creatinine 0.68 11/14/2019 10:07 AM    BUN/Creatinine ratio 15 11/14/2019 10:07 AM    GFR est AA >60 11/14/2019 10:07 AM    GFR est non-AA >60 11/14/2019 10:07 AM    Calcium 9.3 11/14/2019 10:07 AM    Bilirubin, total 0.3 11/14/2019 10:07 AM    AST (SGOT) 55 (H) 11/14/2019 10:07 AM    Alk.  phosphatase 135 (H) 11/14/2019 10:07 AM    Protein, total 7.8 11/14/2019 10:07 AM    Albumin 3.8 11/14/2019 10:07 AM    Globulin 4.0 11/14/2019 10:07 AM    A-G Ratio 1.0 (L) 11/14/2019 10:07 AM    ALT (SGPT) 73 11/14/2019 10:07 AM     Lab Results   Component Value Date/Time    Cholesterol, total 143 11/14/2019 10:07 AM    HDL Cholesterol 42 11/14/2019 10:07 AM    LDL, calculated 81.2 11/14/2019 10:07 AM    VLDL, calculated 19.8 11/14/2019 10:07 AM    Triglyceride 99 11/14/2019 10:07 AM    CHOL/HDL Ratio 3.4 11/14/2019 10:07 AM

## 2019-11-17 DIAGNOSIS — I10 ESSENTIAL HYPERTENSION: ICD-10-CM

## 2019-11-21 NOTE — TELEPHONE ENCOUNTER
Susan Brewer  Call main office 11/21/19 @ 2:31pm patient wants to know if Provider sent all  her medicine to the pharmacy . Patient says that she went Pharmacy and she didn't had all the medicine .     Thank you

## 2019-11-22 DIAGNOSIS — E03.9 ACQUIRED HYPOTHYROIDISM: Primary | ICD-10-CM

## 2019-11-22 DIAGNOSIS — I10 ESSENTIAL HYPERTENSION: ICD-10-CM

## 2019-11-22 RX ORDER — LISINOPRIL AND HYDROCHLOROTHIAZIDE 20; 25 MG/1; MG/1
1 TABLET ORAL DAILY
Qty: 90 TAB | Refills: 1 | Status: SHIPPED | OUTPATIENT
Start: 2019-11-22 | End: 2020-03-27 | Stop reason: SDUPTHER

## 2019-11-22 RX ORDER — LEVOTHYROXINE SODIUM 25 UG/1
25 TABLET ORAL
Qty: 90 TAB | Refills: 1 | Status: SHIPPED | OUTPATIENT
Start: 2019-11-22 | End: 2020-03-27 | Stop reason: SDUPTHER

## 2019-11-25 ENCOUNTER — DOCUMENTATION ONLY (OUTPATIENT)
Dept: FAMILY MEDICINE CLINIC | Age: 49
End: 2019-11-25

## 2019-11-25 NOTE — PROGRESS NOTES
OW called pt to remind her she needs to submit proof of income to complete FA screening. Pt said her  is very busy working and doesn't have much time, but pt will try to submit it as soon as possible.

## 2019-11-26 RX ORDER — LISINOPRIL AND HYDROCHLOROTHIAZIDE 20; 25 MG/1; MG/1
TABLET ORAL
Qty: 90 TAB | Refills: 0 | OUTPATIENT
Start: 2019-11-26

## 2019-11-26 NOTE — TELEPHONE ENCOUNTER
Pt is not answering phone and I tried 3 times to call her. She does not have  set up. I called  pharmacy on [facility] as pt's prescriptions were sent to [facility]. They saw pt at [facility] location earlier in November for other medications that were refilled. I am having the pt's Lisinopril-HCTZ and Levothyroxine that were refilled at [facility] refilled at [facility]. They will send pt a message when this is done. If pt calls back,please let her know that meds are at [address] [facility],  [address].  Sue Marquez RN

## 2019-12-02 ENCOUNTER — DOCUMENTATION ONLY (OUTPATIENT)
Dept: FAMILY MEDICINE CLINIC | Age: 49
End: 2019-12-02

## 2019-12-02 NOTE — PROGRESS NOTES
OW received a voice message from Pt, who called during the weekend to say she had submitted her support letter. OW called pt back to remind her a proof of income is also required. OW could not speak with Pt. Left a voice message.

## 2019-12-02 NOTE — PROGRESS NOTES
Pt called [facility] saying her  receives direct deposits to his bank account. Pt said she will fax his bank statement later today.

## 2019-12-04 ENCOUNTER — DOCUMENTATION ONLY (OUTPATIENT)
Dept: FAMILY MEDICINE CLINIC | Age: 49
End: 2019-12-04

## 2019-12-04 NOTE — PROGRESS NOTES
OW called pt to remind her of pending pay stubs to complete her application. An appt was made for tomorrow. Pt will bring pending documents to [address].

## 2019-12-05 ENCOUNTER — OFFICE VISIT (OUTPATIENT)
Dept: FAMILY MEDICINE CLINIC | Age: 49
End: 2019-12-05

## 2019-12-05 DIAGNOSIS — Z71.89 COUNSELING AND COORDINATION OF CARE: Primary | ICD-10-CM

## 2019-12-05 NOTE — PROGRESS NOTES
OW met pt. Her 's employer does not want to provide letter of employment. OW gave pt letter of refusal to provide employment information for  to fill out. Pt now says they did file for taxes last year. Pt had said they did not, during initial encounter. OW told pt now she would also need to bring tax return form to complete her application.

## 2019-12-09 ENCOUNTER — DOCUMENTATION ONLY (OUTPATIENT)
Dept: FAMILY MEDICINE CLINIC | Age: 49
End: 2019-12-09

## 2019-12-09 NOTE — PROGRESS NOTES
Pt called OW to schedule appt to bring pending documents to complete her Ophthalmology referral. An appt was made for next Thursday at [facility].

## 2019-12-12 ENCOUNTER — OFFICE VISIT (OUTPATIENT)
Dept: FAMILY MEDICINE CLINIC | Age: 49
End: 2019-12-12

## 2019-12-12 DIAGNOSIS — Z71.89 COUNSELING AND COORDINATION OF CARE: Primary | ICD-10-CM

## 2019-12-12 NOTE — PROGRESS NOTES
OW met with patient. Pt brought pending proof of income ('s employer refuses to give him employment letter) and tax returns.

## 2019-12-18 ENCOUNTER — DOCUMENTATION ONLY (OUTPATIENT)
Dept: FAMILY MEDICINE CLINIC | Age: 49
End: 2019-12-18

## 2020-01-14 ENCOUNTER — TELEPHONE (OUTPATIENT)
Dept: FAMILY MEDICINE CLINIC | Age: 50
End: 2020-01-14

## 2020-01-14 NOTE — TELEPHONE ENCOUNTER
Demian Butler  Was contacted to verify is patient is coming to apt Thursday patient did not answer phone call left a  .     Thank you   Brady Stauffer

## 2020-01-16 ENCOUNTER — OFFICE VISIT (OUTPATIENT)
Dept: FAMILY MEDICINE CLINIC | Age: 50
End: 2020-01-16

## 2020-01-16 VITALS
HEART RATE: 76 BPM | DIASTOLIC BLOOD PRESSURE: 80 MMHG | SYSTOLIC BLOOD PRESSURE: 129 MMHG | TEMPERATURE: 99.4 F | OXYGEN SATURATION: 97 %

## 2020-01-16 DIAGNOSIS — E03.9 ACQUIRED HYPOTHYROIDISM: ICD-10-CM

## 2020-01-16 DIAGNOSIS — Z13.9 ENCOUNTER FOR SCREENING: Primary | ICD-10-CM

## 2020-01-16 LAB — GLUCOSE POC: NORMAL MG/DL

## 2020-01-16 NOTE — PROGRESS NOTES
At discharge station AVS was printed and reviewed with pt with Puneet Barahona as . Pt will go home and count he thyroid pills. She needs to make a lab appointment after she has taken 6 weeks of the tablets. She will call the office and schedule the lab appointment after she sees how many pills she has left. Alvarez Maradiaga RN

## 2020-01-16 NOTE — PROGRESS NOTES
Results for orders placed or performed in visit on 01/16/20   AMB POC GLUCOSE BLOOD, BY GLUCOSE MONITORING DEVICE   Result Value Ref Range    Glucose  fasting mg/dL     Coordination of Care  1. Have you been to the ER, urgent care clinic since your last visit? Hospitalized since your last visit? No    2. Have you seen or consulted any other health care providers outside of the [address] [place] [address] since your last visit? Include any pap smears or colon screening. No    Does the patient need refills? NO    Learning Assessment Complete?  yes

## 2020-01-17 NOTE — PROGRESS NOTES
Assessment/Plan:    Diagnoses and all orders for this visit:    1. Encounter for screening  -     AMB POC GLUCOSE BLOOD, BY GLUCOSE MONITORING DEVICE    2. Acquired hypothyroidism        Follow-up and Dispositions    · Return in about 3 weeks (around 2/6/2020). TIMMY León expressed understanding of this plan. An AVS was printed and given to the patient.      ----------------------------------------------------------------------    Chief Complaint   Patient presents with    Diabetes     folow up        History of Present Illness:  Pt presents by my request to discuss her new dx of hypothyroidism and to recheck her sxs now that she is on thyroid replacement. The pt should have been on her new med for 6 weeks but as it turns out, she has been taking the medication only for a few weeks. She was not able to  the medication for several weeks after I called her. She is to go home and count out her pills. When she has only 50 left, she is to come in for repeat labs. As the TSH was found to be elevated on routine lab testing, she really was not having any new sxs so today she has no change. She states that her hair continues to fall out and her skin is dry but these were pre-existing conditions for many years. Will need to recheck her sxs once she has been on the medication for 6 weeks.  She understands and states that she will come back       Past Medical History:   Diagnosis Date    Abnormal menses 4/29/2013    Dysmenorrhea 4/29/2013    Headache(784.0) 4/29/2013    Hx of mammogram no hx    Marital conflict involving divorce 4/29/2013    Other and unspecified hyperlipidemia 4/29/2013    Pap smear for cervical cancer screening 07/2011    Pap smear for cervical cancer screening 2011    Pap smear for cervical cancer screening unknown    Pelvic pain in female 4/29/2013    [address] [address] 4/29/2013    Unspecified essential hypertension 4/29/2013    Urinary tract infection, site not specified 2013       Current Outpatient Medications   Medication Sig Dispense Refill    lisinopril-hydroCHLOROthiazide (PRINZIDE, ZESTORETIC) 20-25 mg per tablet Take 1 Tab by mouth daily. [place] 1 tableta por boca diario. 90 Tab 1    levothyroxine (SYNTHROID) 25 mcg tablet Take 1 Tab by mouth Daily (before breakfast). 90 Tab 1    metFORMIN ER (GLUCOPHAGE XR) 500 mg tablet Take 1 Tab by mouth daily (with dinner). [place] 1 tableta por boca con dee. 90 Tab 3    lovastatin (MEVACOR) 40 mg tablet Take 1 Tab by mouth nightly. [place] 1 tableta por la boca cada noche.  90 Tab 3       No Known Allergies    Social History     Tobacco Use    Smoking status: Former Smoker     Types: Cigarettes     Last attempt to quit: 2013     Years since quittin.5    Smokeless tobacco: Never Used    Tobacco comment: social every other week   Substance Use Topics    Alcohol use: No     Alcohol/week: 0.8 standard drinks     Types: 1 Cans of beer per week    Drug use: No       Family History   Problem Relation Age of Onset    Breast Cancer Maternal Grandmother        Physical Exam:     Visit Vitals  /80 (BP 1 Location: Left arm, BP Patient Position: Sitting)   Pulse 76   Temp 99.4 °F (37.4 °C) (Oral)   LMP 2015   SpO2 97%       A&Ox3  WDWN NAD  Respirations normal and non labored

## 2020-01-24 ENCOUNTER — DOCUMENTATION ONLY (OUTPATIENT)
Dept: FAMILY MEDICINE CLINIC | Age: 50
End: 2020-01-24

## 2020-01-24 NOTE — PROGRESS NOTES
OW called pt to let her know I received a notice back from [facility] saying that the information that Pt gave was not in accordance with what the application said. Pt said someone from [employer] already called her and that she will write a letter and submit it to an e-mail she was given to complete the Application. OW also told the patient that she missed her appointment yesterday and patient said that she forgot she had an appt with OW.

## 2020-01-29 ENCOUNTER — DOCUMENTATION ONLY (OUTPATIENT)
Dept: FAMILY MEDICINE CLINIC | Age: 50
End: 2020-01-29

## 2020-01-29 NOTE — PROGRESS NOTES
OW called Pt to confirm if she had submitted letter to [employer] or if she needed help. Pt said she had already sent the letter to [employer] via e-mail and that she was about to call to confirm with them if they had received it. OW told pt she is available to help if pt needed help. Pt told OW she did need help to complete her child's Medicaid Application. OW offered to schedule an appt for Pt and pt refused, saying that she'd rather wait to have her Lab appt to make both appts on the same day. OW confirmed the required documents for Medicaid appl. Pt wrote the required documents down and said she'd call to make the appt once she has all the documents and knows her next Lab appt date.

## 2020-03-27 ENCOUNTER — OFFICE VISIT (OUTPATIENT)
Dept: FAMILY MEDICINE CLINIC | Age: 50
End: 2020-03-27

## 2020-03-27 DIAGNOSIS — I10 ESSENTIAL HYPERTENSION: ICD-10-CM

## 2020-03-27 DIAGNOSIS — E11.9 TYPE 2 DIABETES MELLITUS WITHOUT COMPLICATION, WITHOUT LONG-TERM CURRENT USE OF INSULIN (HCC): ICD-10-CM

## 2020-03-27 DIAGNOSIS — R74.8 ELEVATED ALKALINE PHOSPHATASE MEASUREMENT: Primary | ICD-10-CM

## 2020-03-27 DIAGNOSIS — E03.9 ACQUIRED HYPOTHYROIDISM: ICD-10-CM

## 2020-03-27 RX ORDER — LISINOPRIL AND HYDROCHLOROTHIAZIDE 20; 25 MG/1; MG/1
1 TABLET ORAL DAILY
Qty: 90 TAB | Refills: 1 | Status: SHIPPED | OUTPATIENT
Start: 2020-03-27 | End: 2021-01-25

## 2020-03-27 RX ORDER — LEVOTHYROXINE SODIUM 25 UG/1
25 TABLET ORAL
Qty: 90 TAB | Refills: 1 | Status: SHIPPED | OUTPATIENT
Start: 2020-03-27 | End: 2021-01-25

## 2020-03-27 RX ORDER — LOVASTATIN 40 MG/1
40 TABLET ORAL
Qty: 90 TAB | Refills: 3 | Status: SHIPPED | OUTPATIENT
Start: 2020-03-27 | End: 2021-03-24 | Stop reason: SDUPTHER

## 2020-03-27 RX ORDER — METFORMIN HYDROCHLORIDE 500 MG/1
500 TABLET, EXTENDED RELEASE ORAL
Qty: 90 TAB | Refills: 3 | Status: SHIPPED | OUTPATIENT
Start: 2020-03-27 | End: 2021-03-24 | Stop reason: SDUPTHER

## 2020-03-27 NOTE — PROGRESS NOTES
Coordination of Care  1. Have you been to the ER, urgent care clinic since your last visit? Hospitalized since your last visit? No    2. Have you seen or consulted any other health care providers outside of the [address] [place] [address] since your last visit? Include any pap smears or colon screening. No    Does the patient need refills? YES    Learning Assessment Complete? yes     Intake was done on the phone today with [facility] as .  Socorro Noland RN

## 2020-03-27 NOTE — PROGRESS NOTES
Assessment/Plan:    Diagnoses and all orders for this visit:    1. Type 2 diabetes mellitus without complication, without long-term current use of insulin (HCC)  -     metFORMIN ER (GLUCOPHAGE XR) 500 mg tablet; Take 1 Tab by mouth daily (with dinner). [place] 1 tableta por boca con dee. 2. Essential hypertension  -     lovastatin (MEVACOR) 40 mg tablet; Take 1 Tab by mouth nightly. [place] 1 tableta por la boca cada noche.  -     lisinopril-hydroCHLOROthiazide (PRINZIDE, ZESTORETIC) 20-25 mg per tablet; Take 1 Tab by mouth daily. [place] 1 tableta por boca diario. 3. Acquired hypothyroidism  -     levothyroxine (SYNTHROID) 25 mcg tablet; Take 1 Tab by mouth Daily (before breakfast). -     TSH 3RD GENERATION; Future  -     T4, FREE; Future    We discussed prevention of COVID 19 virus, risk reduction. She is aware  She is due for routine labs- will postpone due to the epidemic. F/up as soon as we can reopen or in about 2 months   Elevated alk phos (mild), need to repeat which was planned for today's visit     Follow-up and Dispositions    · Return in about 8 weeks (around 2020). Bayard Maus McFerren, PA-C Lazarus Shropshire expressed understanding of this plan. An AVS was printed and given to the patient.      ----------------------------------------------------------------------    Chief Complaint   Patient presents with    Thyroid Problem    Medication Refill     she thinks she needs them all but not sure       History of Present Illness:    Pt was called for her scheduled telephone virtual appt and was identified with name and . She consented to the telephone visit. She tells me that she is doing very well. She thinks that she is taking all meds as directed. She picked up refills yesterday. She states that she feels well and has no sxs of high/low blood sugar and she is feeling well as far as the new thyroid medication is concerned. She understands precautions to take with regard to COVID 19.   She has no additional questions and is watching children in her house so it is hard for her to talk at this time     Past Medical History:   Diagnosis Date    Abnormal menses 2013    Dysmenorrhea 2013    Headache(784.0) 2013    Hx of mammogram no hx    Marital conflict involving divorce 2013    Other and unspecified hyperlipidemia 2013    Pap smear for cervical cancer screening 2011    Pap smear for cervical cancer screening     Pap smear for cervical cancer screening unknown    Pelvic pain in female 2013    Thrombasthenia ([address] 2013    Unspecified essential hypertension 2013    Urinary tract infection, site not specified 2013       Current Outpatient Medications   Medication Sig Dispense Refill    metFORMIN ER (GLUCOPHAGE XR) 500 mg tablet Take 1 Tab by mouth daily (with dinner). [place] 1 tableta por boca con dee. 90 Tab 3    lovastatin (MEVACOR) 40 mg tablet Take 1 Tab by mouth nightly. [place] 1 tableta por la boca cada noche. 90 Tab 3    lisinopril-hydroCHLOROthiazide (PRINZIDE, ZESTORETIC) 20-25 mg per tablet Take 1 Tab by mouth daily. [place] 1 tableta por boca diario. 90 Tab 1    levothyroxine (SYNTHROID) 25 mcg tablet Take 1 Tab by mouth Daily (before breakfast).  90 Tab 1       No Known Allergies    Social History     Tobacco Use    Smoking status: Former Smoker     Types: Cigarettes     Last attempt to quit: 2013     Years since quittin.7    Smokeless tobacco: Never Used    Tobacco comment: social every other week   Substance Use Topics    Alcohol use: No     Alcohol/week: 0.8 standard drinks     Types: 1 Cans of beer per week    Drug use: No       Family History   Problem Relation Age of Onset    Breast Cancer Maternal Grandmother        Physical Exam:     Visit Vitals  LMP 2015     No PE  A&Ox3  WDWN NAD  Respirations normal and non labored  Lab Results   Component Value Date/Time    Hemoglobin A1c 6.7 (H) 2019 10:07 AM Lab Results   Component Value Date/Time    TSH 6.83 (H) 11/14/2019 10:07 AM     Lab Results   Component Value Date/Time    Sodium 139 11/14/2019 10:07 AM    Potassium 3.4 (L) 11/14/2019 10:07 AM    Chloride 104 11/14/2019 10:07 AM    CO2 31 11/14/2019 10:07 AM    Anion gap 4 (L) 11/14/2019 10:07 AM    Glucose 111 (H) 11/14/2019 10:07 AM    BUN 10 11/14/2019 10:07 AM    Creatinine 0.68 11/14/2019 10:07 AM    BUN/Creatinine ratio 15 11/14/2019 10:07 AM    GFR est AA >60 11/14/2019 10:07 AM    GFR est non-AA >60 11/14/2019 10:07 AM    Calcium 9.3 11/14/2019 10:07 AM    Bilirubin, total 0.3 11/14/2019 10:07 AM    AST (SGOT) 55 (H) 11/14/2019 10:07 AM    Alk.  phosphatase 135 (H) 11/14/2019 10:07 AM    Protein, total 7.8 11/14/2019 10:07 AM    Albumin 3.8 11/14/2019 10:07 AM    Globulin 4.0 11/14/2019 10:07 AM    A-G Ratio 1.0 (L) 11/14/2019 10:07 AM    ALT (SGPT) 73 11/14/2019 10:07 AM

## 2020-06-23 ENCOUNTER — OFFICE VISIT (OUTPATIENT)
Dept: FAMILY MEDICINE CLINIC | Age: 50
End: 2020-06-23

## 2020-06-23 DIAGNOSIS — Z87.898 H/O DOMESTIC VIOLENCE: Primary | ICD-10-CM

## 2020-06-23 DIAGNOSIS — I10 ESSENTIAL HYPERTENSION: ICD-10-CM

## 2020-06-23 DIAGNOSIS — E11.9 DM TYPE 2, GOAL HBA1C 7%-8% (HCC): ICD-10-CM

## 2020-06-23 NOTE — Clinical Note
Please make her a lab appt in 3 months then a medical appt one week later.  Can be virtual or face to face at that time
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
## 2020-11-06 ENCOUNTER — TRANSCRIBE ORDER (OUTPATIENT)
Dept: SCHEDULING | Age: 50
End: 2020-11-06

## 2020-11-06 DIAGNOSIS — Z12.31 VISIT FOR SCREENING MAMMOGRAM: Primary | ICD-10-CM

## 2020-11-10 ENCOUNTER — HOSPITAL ENCOUNTER (OUTPATIENT)
Dept: MAMMOGRAPHY | Age: 50
Discharge: HOME | End: 2020-11-10

## 2020-11-10 ENCOUNTER — OFFICE VISIT (OUTPATIENT)
Dept: FAMILY PLANNING/WOMEN'S HEALTH CLINIC | Age: 50
End: 2020-11-10

## 2020-11-10 DIAGNOSIS — Z01.419 ENCOUNTER FOR WELL WOMAN EXAM: Primary | ICD-10-CM

## 2020-11-10 DIAGNOSIS — Z12.31 VISIT FOR SCREENING MAMMOGRAM: ICD-10-CM

## 2020-11-10 PROCEDURE — 77067 SCR MAMMO BI INCL CAD: CPT

## 2020-11-10 NOTE — PROGRESS NOTES
EVERY WOMANS LIFE HISTORY QUESTIONNAIRE       No Yes Comments   Has a doctor ever seen or felt anything wrong with your breast? [x]                                  []                                     Have you ever had a breast biopsy? [x]                                  []                                          When and where was last mammogram performed? 7/12/2019 with [facility]    Have you ever been told that there was a problem on your mammogram?   No Yes Comments   [x]                                  []                                       Do you have breast implants? No Yes Comments   [x]                                  []                                       When was your last Pap test performed? 7/12/2019 with [facility]    Have you ever had an abnormal Pap test?   No Yes Comments   [x]                                  []                                       Have you had a hysterectomy? No Yes Comments (why)   [x]                                  []                                       Have you been through menopause? No Yes Date of LMP   []                                  [x]                                  At least 5 yrs ago     Did your mother take MELANIA? No Yes Unknown   [x]                                  []                                       Do you have a history of HIV exposure? No Yes    [x]                                  []                                       Have you ever been diagnosed with any type of Cancer   No Yes Comments (type,when,where,type of treatment   [x]                                  []                                          Has a family member been diagnosed with breast or ovarian cancer?    No Yes Comments (which family members, and type   [x]                                  []                                       Are you taking hormone replacement therapy (HRT)     No Yes Comments   [x]                                  [] How many times have you been pregnant? 2      Number of live births ? 2    Are you experiencing any of the following? No Yes Comments   Nipple Discharge [x]                                  []                                     Breast Lump/Masses [x]                                  []                                     Breast Skin Changes [x]                                  []                                          No Yes Comments   Vaginal Discharge [x]                                  []                                     Abnormal/unusual vaginal bleeding [x]                                  []                                         Are you experiencing any other health problems? HTN, diabetes, high chol---followed at [facility]         Age at first period? 25  Age at first birth?  24
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
## 2020-12-15 ENCOUNTER — TELEPHONE (OUTPATIENT)
Dept: FAMILY MEDICINE CLINIC | Age: 50
End: 2020-12-15

## 2020-12-15 NOTE — TELEPHONE ENCOUNTER
Patient called OW to ask for a renewal for Access Now.  OW explained the process to patient and told her she needs to see a provider in order to be referred. OW gave the patient CVAN appointment line number so she can call and ask for an appointment with a provider. Patient verbalized understanding. No. BERRY screening performed.  STOP BANG Legend: 0-2 = LOW Risk; 3-4 = INTERMEDIATE Risk; 5-8 = HIGH Risk

## 2020-12-18 ENCOUNTER — TELEPHONE (OUTPATIENT)
Dept: FAMILY MEDICINE CLINIC | Age: 50
End: 2020-12-18

## 2021-01-22 DIAGNOSIS — E03.9 ACQUIRED HYPOTHYROIDISM: ICD-10-CM

## 2021-01-22 DIAGNOSIS — I10 ESSENTIAL HYPERTENSION: ICD-10-CM

## 2021-01-25 RX ORDER — LEVOTHYROXINE SODIUM 25 UG/1
TABLET ORAL
Qty: 90 TAB | Refills: 0 | Status: SHIPPED | OUTPATIENT
Start: 2021-01-25 | End: 2021-01-29

## 2021-01-25 RX ORDER — LISINOPRIL AND HYDROCHLOROTHIAZIDE 20; 25 MG/1; MG/1
TABLET ORAL
Qty: 90 TAB | Refills: 0 | Status: SHIPPED | OUTPATIENT
Start: 2021-01-25 | End: 2021-03-24 | Stop reason: SDUPTHER

## 2021-01-26 DIAGNOSIS — E03.9 ACQUIRED HYPOTHYROIDISM: ICD-10-CM

## 2021-01-27 ENCOUNTER — TELEPHONE (OUTPATIENT)
Dept: FAMILY MEDICINE CLINIC | Age: 51
End: 2021-01-27

## 2021-01-27 NOTE — TELEPHONE ENCOUNTER
Patient called OW to ask for the AN phone number to schedule an appointment. OW provided the phone number to patient.

## 2021-01-28 ENCOUNTER — VIRTUAL VISIT (OUTPATIENT)
Dept: FAMILY MEDICINE CLINIC | Age: 51
End: 2021-01-28

## 2021-01-28 DIAGNOSIS — Z79.899 MEDICATION MANAGEMENT: Primary | ICD-10-CM

## 2021-01-28 PROCEDURE — 99442 PR PHYS/QHP TELEPHONE EVALUATION 11-20 MIN: CPT | Performed by: PHYSICIAN ASSISTANT

## 2021-01-28 NOTE — PROGRESS NOTES
Assessment/Plan:    Diagnoses and all orders for this visit:    1. Medication management    I called and spoke to [facility] pharmacist about the change in the levothyroxine 25 mcg availability. She reassured me that there was no difference in the medication itself, only in the manufacture of the medication. (went from generic synthroid to generic euthroyx both 25mcg). Pt is due for labs and in person evaluation, she will need to make an appt for this     Follow-up and Dispositions    · Return in about 2 months (around 3/28/2021) for please schedule a f2f appt. Grady Anderson PA-C  Biju Poarch expressed understanding of this plan. An AVS was printed and given to the patient.      ----------------------------------------------------------------------    Chief Complaint   Patient presents with    Thyroid Problem     f/u    Hypertension     f/u       History of Present Illness:  Pt called and consented to virtual telephone call, she declined video  She was identified by name and     She is being seen today for a concern with her new thyroid medication. It is from a different  than she had before. As noted above, I called the pharmacy and they reassured me that there are no reported side effects to this change, and currently they are not able to get the 25 mcg l-thyroxine in loose pills, only these in the blister packs.     Pt to be reassured that this is an okay change   She is due for labs and check up       Past Medical History:   Diagnosis Date    Abnormal menses 2013    Dysmenorrhea 2013    Headache(784.0) 2013    Hx of mammogram no hx    Marital conflict involving divorce 2013    Other and unspecified hyperlipidemia 2013    Pap smear for cervical cancer screening 2011    Pap smear for cervical cancer screening     Pap smear for cervical cancer screening unknown    Pelvic pain in female 2013    Thrombasthenia ([address] 2013    Unspecified essential hypertension 2013    Urinary tract infection, site not specified 2013       Current Outpatient Medications   Medication Sig Dispense Refill    lisinopril-hydroCHLOROthiazide (PRINZIDE, ZESTORETIC) 20-25 mg per tablet Take 1 tablet by mouth once daily 90 Tab 0    metFORMIN ER (GLUCOPHAGE XR) 500 mg tablet Take 1 Tab by mouth daily (with dinner). [place] 1 tableta por boca con dee. 90 Tab 3    lovastatin (MEVACOR) 40 mg tablet Take 1 Tab by mouth nightly. [place] 1 tableta por la boca cada noche.  90 Tab 3    Euthyrox 25 mcg tablet TAKE 1 TABLET BY MOUTH ONCE DAILY BEFORE BREAKFAST 90 Tab 0       No Known Allergies    Social History     Tobacco Use    Smoking status: Former Smoker     Types: Cigarettes     Quit date: 2013     Years since quittin.5    Smokeless tobacco: Never Used   Substance Use Topics    Alcohol use: No     Alcohol/week: 0.8 standard drinks     Types: 1 Cans of beer per week    Drug use: No       Family History   Problem Relation Age of Onset    Breast Cancer Maternal Grandmother        Physical Exam:     Visit Vitals  LMP 2015       A&Ox3  WDWN NAD  Respirations normal and non labored

## 2021-01-28 NOTE — PROGRESS NOTES
Coordination of Care  1. Have you been to the ER, urgent care clinic since your last visit? Hospitalized since your last visit? No    2. Have you seen or consulted any other health care providers outside of the [address] [place] [address] since your last visit? Include any pap smears or colon screening. No    Does the patient need refills? NO    Learning Assessment Complete? yes  Depression Screening complete in the past 12 months? yes     Patient states she was given the wrong medication at the pharmacy and returned medicine to pharmacy. Now patient is afraid to take what was given to her. Patient wants to verify it is the right medicine w/ provider. Per patient no access to vital sign equipment at home.

## 2021-01-28 NOTE — Clinical Note
Pt is asking for a renewal of AN referral to see the opthalmologist again. She feels her vision has worsened.  Paula Thomas RN

## 2021-01-28 NOTE — PROGRESS NOTES
Ryan Walker with [employer] assisted with this phone call today. I called pt and discussed with her that Ricarda Humphriesr called the [facility] pharmacist and confirmed that the Euthyrox is equivalent to the Levothyroxine and is safe to substitute.  Maureen Oneal RN

## 2021-01-29 ENCOUNTER — TELEPHONE (OUTPATIENT)
Dept: FAMILY MEDICINE CLINIC | Age: 51
End: 2021-01-29

## 2021-01-29 RX ORDER — LEVOTHYROXINE SODIUM 25 UG/1
TABLET ORAL
Qty: 90 TAB | Refills: 0 | Status: SHIPPED | OUTPATIENT
Start: 2021-01-29 | End: 2021-03-24 | Stop reason: SDUPTHER

## 2021-01-29 NOTE — TELEPHONE ENCOUNTER
Tc to the pt. The providers message stated the pt asked her D/C nurse for a Opthomology referral. Int # [id number]. The pt was given the providers message that she would need another VV appt to talk about her need for Opthomology. The pt stated she already has been told she will have an appt in March. The [facility] said they would call her in Feb or March to schedule an appt with the Dr and she will talk to the Dr about her need for the referral to the Opthomologist then. The pt was given the same day appt line number and protocol to call for a [facility] PCP appt earlier if needed.  Jaleesa Larkin RN

## 2021-01-29 NOTE — PROGRESS NOTES
Patient's enrollment in Access Now  on 2020. We referred her to Ophthalmology and she was seen by Dr. Jamaal Purvis on 20. His note does not appear to indicate that any follow-up is needed after that visit. Since it seems that the need for the first referral has been addressed, we will need a new referral before we can schedule a financial screening appointment for Access Now enrollment. Cleo Robles RN

## 2021-03-16 ENCOUNTER — TELEPHONE (OUTPATIENT)
Dept: FAMILY MEDICINE CLINIC | Age: 51
End: 2021-03-16

## 2021-03-16 NOTE — TELEPHONE ENCOUNTER
This patient called Irving Patton with [facility]  services asking for help getting her follow up appointment scheduled. Irving Patton called me and in review of patient's chart she needs a F2F with Shirin Calvo around the end of March based on the virtual visit note from MM at last visit. I am routing this request to registrar to please call patient to schedule appointment. Thank you!  Layton Birmingham RN

## 2021-03-24 ENCOUNTER — TELEPHONE (OUTPATIENT)
Dept: FAMILY MEDICINE CLINIC | Age: 51
End: 2021-03-24

## 2021-03-24 ENCOUNTER — OFFICE VISIT (OUTPATIENT)
Dept: FAMILY MEDICINE CLINIC | Age: 51
End: 2021-03-24

## 2021-03-24 ENCOUNTER — HOSPITAL ENCOUNTER (OUTPATIENT)
Dept: LAB | Age: 51
Discharge: HOME | End: 2021-03-24

## 2021-03-24 VITALS
HEIGHT: 60 IN | SYSTOLIC BLOOD PRESSURE: 130 MMHG | HEART RATE: 61 BPM | TEMPERATURE: 97.5 F | OXYGEN SATURATION: 98 % | WEIGHT: 179 LBS | DIASTOLIC BLOOD PRESSURE: 80 MMHG | BODY MASS INDEX: 35.14 KG/M2

## 2021-03-24 DIAGNOSIS — E03.9 HYPOTHYROIDISM, UNSPECIFIED TYPE: ICD-10-CM

## 2021-03-24 DIAGNOSIS — M25.531 PAIN IN BOTH WRISTS: ICD-10-CM

## 2021-03-24 DIAGNOSIS — E11.9 TYPE 2 DIABETES MELLITUS WITHOUT COMPLICATION, WITHOUT LONG-TERM CURRENT USE OF INSULIN (HCC): ICD-10-CM

## 2021-03-24 DIAGNOSIS — E11.9 DM TYPE 2, GOAL HBA1C 7%-8% (HCC): Primary | ICD-10-CM

## 2021-03-24 DIAGNOSIS — M25.532 PAIN IN BOTH WRISTS: ICD-10-CM

## 2021-03-24 DIAGNOSIS — E03.9 ACQUIRED HYPOTHYROIDISM: ICD-10-CM

## 2021-03-24 DIAGNOSIS — E11.9 DM TYPE 2, GOAL HBA1C 7%-8% (HCC): ICD-10-CM

## 2021-03-24 DIAGNOSIS — I10 ESSENTIAL HYPERTENSION: ICD-10-CM

## 2021-03-24 LAB — GLUCOSE POC: NORMAL MG/DL

## 2021-03-24 PROCEDURE — 82962 GLUCOSE BLOOD TEST: CPT | Performed by: PHYSICIAN ASSISTANT

## 2021-03-24 PROCEDURE — 99213 OFFICE O/P EST LOW 20 MIN: CPT | Performed by: PHYSICIAN ASSISTANT

## 2021-03-24 RX ORDER — LEVOTHYROXINE SODIUM 25 UG/1
TABLET ORAL
Qty: 90 TAB | Refills: 3 | Status: SHIPPED | OUTPATIENT
Start: 2021-03-24 | End: 2022-06-15 | Stop reason: SDUPTHER

## 2021-03-24 RX ORDER — LOVASTATIN 40 MG/1
40 TABLET ORAL
Qty: 90 TAB | Refills: 3 | Status: SHIPPED | OUTPATIENT
Start: 2021-03-24 | End: 2022-06-15 | Stop reason: SDUPTHER

## 2021-03-24 RX ORDER — ARM BRACE
EACH MISCELLANEOUS
Qty: 1 EACH | Refills: 2 | Status: SHIPPED | OUTPATIENT
Start: 2021-03-24

## 2021-03-24 RX ORDER — METFORMIN HYDROCHLORIDE 500 MG/1
500 TABLET, EXTENDED RELEASE ORAL
Qty: 90 TAB | Refills: 3 | Status: SHIPPED | OUTPATIENT
Start: 2021-03-24 | End: 2022-06-15 | Stop reason: SDUPTHER

## 2021-03-24 RX ORDER — LISINOPRIL AND HYDROCHLOROTHIAZIDE 20; 25 MG/1; MG/1
TABLET ORAL
Qty: 90 TAB | Refills: 3 | Status: SHIPPED | OUTPATIENT
Start: 2021-03-24 | End: 2022-06-15 | Stop reason: SDUPTHER

## 2021-03-24 NOTE — PROGRESS NOTES
Coordination of Care  1. Have you been to the ER, urgent care clinic since your last visit? Hospitalized since your last visit? No    2. Have you seen or consulted any other health care providers outside of the [address] [place] [address] since your last visit? Include any pap smears or colon screening. No    Does the patient need refills? NO    Learning Assessment Complete?  yes  Depression Screening complete in the past 12 months? yes  Results for orders placed or performed in visit on 03/24/21   AMB POC GLUCOSE BLOOD, BY GLUCOSE MONITORING DEVICE   Result Value Ref Range    Glucose POC fasting 106 MG/DL

## 2021-03-24 NOTE — PROGRESS NOTES
Assessment/Plan:    Diagnoses and all orders for this visit:    1. DM type 2, goal HbA1c 7%-8% ([facility])  -     AMB POC GLUCOSE BLOOD, BY GLUCOSE MONITORING DEVICE  -     HEMOGLOBIN A1C WITH EAG; Future  -     METABOLIC PANEL, COMPREHENSIVE; Future  -     MICROALBUMIN, UR, RAND W/ MICROALB/CREAT RATIO; Future  -      DIABETES FOOT EXAM  -      DIABETES EYE EXAM     2. Hypothyroidism, unspecified type  -     TSH 3RD GENERATION; Future    3. Acquired hypothyroidism  -     levothyroxine (SYNTHROID) 25 mcg tablet; TAKE 1 TABLET BY MOUTH ONCE DAILY BEFORE BREAKFAST    4. Essential hypertension  -     lisinopril-hydroCHLOROthiazide (PRINZIDE, ZESTORETIC) 20-25 mg per tablet; Take 1 tablet by mouth once daily  -     lovastatin (MEVACOR) 40 mg tablet; Take 1 Tab by mouth nightly. [place] 1 tableta por la boca cada noche. 5. Type 2 diabetes mellitus without complication, without long-term current use of insulin ([facility])  -     metFORMIN ER (GLUCOPHAGE XR) 500 mg tablet; Take 1 Tab by mouth daily (with dinner). [place] 1 tableta por boca con dee. Follow-up and Dispositions    · Return in about 6 months (around 9/24/2021). TIMMY Benjamin expressed understanding of this plan. An AVS was printed and given to the patient.      ----------------------------------------------------------------------    Chief Complaint   Patient presents with    Diabetes     f/u    Hypertension     f/u       History of Present Illness:  Pt presents for DM and HTN and hypothyroidism check up  She states that she is doing well in general  She declines COVID vaccine  She has been having wrist pain at night. She is not working currently.  The pain goes away when she moves her wrists, it seems to be positionally related  She has no vaginal itching, blurry vision, unexplained weight loss  She denies chest pain, SOB  She will be referred to ophthalmology for diabetic eye exam   She is fasting today for labs       Past Medical History:   Diagnosis Date    Abnormal menses 2013    Dysmenorrhea 2013    Headache(784.0) 2013    Hx of mammogram no hx    Marital conflict involving divorce 2013    Other and unspecified hyperlipidemia 2013    Pap smear for cervical cancer screening 2011    Pap smear for cervical cancer screening     Pap smear for cervical cancer screening unknown    Pelvic pain in female 2013    Thrombasthenia ([address] 2013    Unspecified essential hypertension 2013    Urinary tract infection, site not specified 2013       Current Outpatient Medications   Medication Sig Dispense Refill    levothyroxine (SYNTHROID) 25 mcg tablet TAKE 1 TABLET BY MOUTH ONCE DAILY BEFORE BREAKFAST 90 Tab 3    lisinopril-hydroCHLOROthiazide (PRINZIDE, ZESTORETIC) 20-25 mg per tablet Take 1 tablet by mouth once daily 90 Tab 3    lovastatin (MEVACOR) 40 mg tablet Take 1 Tab by mouth nightly. [employer] 1 tableta por la boca cada noche. 90 Tab 3    metFORMIN ER (GLUCOPHAGE XR) 500 mg tablet Take 1 Tab by mouth daily (with dinner). [employer] 1 tableta por boca con dee. 90 Tab 3       No Known Allergies    Social History     Tobacco Use    Smoking status: Former Smoker     Types: Cigarettes     Quit date: 2013     Years since quittin.7    Smokeless tobacco: Never Used   Substance Use Topics    Alcohol use: No     Alcohol/week: 0.8 standard drinks     Types: 1 Cans of beer per week    Drug use: No       Family History   Problem Relation Age of Onset    Breast Cancer Maternal Grandmother        Physical Exam:     Visit Vitals  /80 (BP 1 Location: Left upper arm, BP Patient Position: Sitting)   Pulse 61   Temp 97.5 °F (36.4 °C) (Temporal)   Ht 5' 0.12\" (1.527 m)   Wt 179 lb (81.2 kg)   LMP 2015   SpO2 98%   BMI 34.82 kg/m²       A&Ox3  WDWN NAD  Respirations normal and non labored  Foot exam- maxwell neg for skin changes, erosions, lesions, skin break down.  maxwell DP pulse intact, harder to find on right foot.  Feet warm, cooler at toes  Interdigit skin flakiness - recommended lubriderm  Sensation intact to light touch on soles

## 2021-03-24 NOTE — PROGRESS NOTES
I have copied the provider's check out note here and have reviewed these items with the patient today:  Please alfonso that she has declined COVID vaccine   Please explain how to use the wrist braces for carpal tunnel        Return in about 6 months (around 9/24/2021). I have printed AVS and reviewed it with patient today. Patient verbalized understanding. I instructed patient that they will receive a phone call for a follow-up appointment. I have reviewed patients medications will be called into pharmacy and given her the information for [facility] and she verbalized understanding. Patient also instructed that order for wrist brace for carpal tunnel syndrome was called in and she will be instructed on how to use the brace. Patient instructed that she will receive a call to schedule an eye appointment through access now. Patient refused COVID vaccine. Patient verbalized understanding.  Breana Dorman RN

## 2021-03-24 NOTE — PATIENT INSTRUCTIONS
Síndrome del delmer carpiano: Instrucciones de cuidado Carpal Tunnel Syndrome: Care Instructions Instrucciones de cuidado El síndrome del delmer carpsony es un problema nervioso. Puede causar hormigueo, entumecimiento, debilidad o [employer] dedos, el pulgar y la [place]. El nervio mediano y varios tejidos fibrosos, llamados tendones, atraviesan la mario por un espacio denominado delmer carpiano. El movimiento repetido de las violet al trabajar o practicar ciertos pasatiempos y deportes puede hacer presión sobre el nervio. El embarazo y ciertas afecciones médicas, veronika la diabetes, la artritis y [facility] tiroides hipoactiva, también pueden causar el síndrome del delmer carpsony. Para reducir los síntomas, usted podría limitar [facility] o [facility]. También puede tyesha otras medidas para sentirse mejor. Si los síntomas son leves, es probable que pueda aliviar el dolor con 1 o 2 semanas de tratamiento en el hogar. La cirugía es necesaria solo si otros tratamientos no funcionan. La atención de seguimiento es josh parte clave de lanier tratamiento y seguridad. Asegúrese de hacer y acudir a todas las citas, y llame a lanier médico si está teniendo problemas. También es josh buena idea saber los resultados de noel exámenes y mantener josh lista de los medicamentos que janet. Cómo puede cuidarse en el hogar? · Si es posible, interrumpa o disminuya la actividad que causa los síntomas. Si no puede suspenderla, jodi pausas frecuentes para descansar y estirarse o cambie la posición de las violet para hacer josh tarea. Trate de [place] [facility], veronika cuando [place] el ratón de josh computadora. · Trate de evitar doblar o rotar las muñecas. · Pregúntele a lanier médico si puede tyesha un analgésico (medicamento para el dolor) de venta memo, veronika acetaminofén (Tylenol), ibuprofeno (Advil, Motrin) o naproxeno (Aleve). Sea chanel con los medicamentos. Radha y siga todas las instrucciones de la [place].  
· Si lanier médico le receta corticosteroides para ayudar a aliviar el dolor y reducir la hinchazón, tómelos exactamente veronika le fueron recetados. Llame a lanier médico si yuko estar teniendo problemas con lanier medicamento. · Colóquese hielo o josh compresa fría sobre la [place] de 10 a 20 minutos cada vez para aliviar el dolor. Póngase un paño lovett entre el hielo y la piel. · Si lanier médico o lanier fisioterapeuta o terapeuta ocupacional le indica que use josh tablilla (férula) para la [place], [place] según las indicaciones para mantener la [place] en josh posición neutra. [place] también reduce la presión sobre lanier nervio mediano. · Pregúntele a lanier médico si debería hacer sesiones de fisioterapia o de terapia ocupacional para aprender a hacer las tareas de [employer]. · [place] clases de yoga para estirar los músculos y fortalecer las violet y las [place]. El yoga ha [employer] síntomas del túnel carpiano. Cómo prevenir el síndrome del túnel carpiano · Cuando trabaje en la computadora, [employer] y las muñecas alineadas con los antebrazos. Mantenga los codos cerca de noel costados. [place] un descanso con josh frecuencia de 10 a 15 minutos. · Trate de hacer los siguientes ejercicios: 
? Calentamiento: Gire la [place], [place] y de un lado a otro. Repita esto 4 veces. [place] [employer] dedos, relájelos y vuelva a estirarlos. Repita 4 veces. Estire el pulgar doblándolo levemente hacia atrás, manténgalo en constantino posición y relájelo. Repita 4 veces. ? Estiramiento con los [facility] en posición de orar: Comience colocando las jimbo de las violet juntas kaushik del [place], miracle debajo del [place]. Baje las violet lentamente hacia la línea de la cintura y manténgalas cerca del estómago con las jimbo juntas hasta que sienta un estiramiento leve a moderado debajo de los antebrazos. Mantenga la posición entre 10 y 21 segundos. Repita 4 veces. ? Estiramiento del flexor de la mario: Extienda el Be[place] Pyo frente a usted, con la tafoya [place] arriba. Doble la mario y apunte con la mano hacia el piso. Con la [employer], doble con suavidad la mario aún más hasta que sienta un estiramiento entre leve y moderado en el antebrazo. Mantenga la posición entre 10 y 21 segundos. Repita 4 veces. ? Estiramiento del extensor de la mario: Repita los pasos para el estiramiento del flexor de la mario ankit comience con la tafoya extendida Lisa Kelly. · Apriete jsoh pelota de goma varias veces al día para mantener jasbir las violet y los dedos. · Evite sostener objetos (veronika un libro) en la misma posición tarik mucho tiempo. Siempre que sea posible, utilice toda la mano para tyesha un objeto. Si [place] solo el pulgar y el dedo índice puede tensionar la [place]. · No fume. Puede empeorar esta afección ya que reduce el flujo de dotty hacia el nervio El paso. Si necesita ayuda para dejar de fumar, hable con lanier médico sobre programas y medicamentos para dejar de fumar. Estos pueden aumentar noel probabilidades de dejar el hábito para siempre. Cuándo debe pedir ayuda? Preste especial atención a los cambios de lanier darlene y asegúrese de comunicarse con lanier médico si: 
  · El dolor u otros problemas no mejoran con los cuidados en el hogar.  
  · Desea más información sobre la fisioterapia o la terapia ocupacional.  
  · Tiene efectos secundarios producidos por los corticosteroides, tales veronika: ? Aumento de [place]. ? Cambios en el estado de ánimo. ? Problemas para dormir. ? Fácil formación de moretones.  
  · Tiene otros problemas con los medicamentos. Dónde puede encontrar más información en inglés? Wm Nolan a http://www.gray.com/ Escriba R432 en la búsqueda para aprender más acerca de \"Síndrome del delmer galeas: Instrucciones de cuidado. \" Revisado: 2 marzo, 2020               Versión del contenido: 12.6 © 8019-5170 [employer].   
Las instrucciones de cuidado fueron adaptadas bajo licencia por [employer] (which disclaims liability or warranty for this information). Si usted tiene [place] afección médica o sobre estas instrucciones, siempre pregunte a lanier profesional de darlene. [facility] niega toda garantía o responsabilidad por lanier uso de esta información.

## 2021-03-25 LAB
ALBUMIN SERPL-MCNC: 3.9 G/DL (ref 3.5–5)
ALBUMIN/GLOB SERPL: 1 {RATIO} (ref 1.1–2.2)
ALP SERPL-CCNC: 120 U/L (ref 45–117)
ALT SERPL-CCNC: 40 U/L (ref 12–78)
ANION GAP SERPL CALC-SCNC: 7 MMOL/L (ref 5–15)
AST SERPL-CCNC: 31 U/L (ref 15–37)
BILIRUB SERPL-MCNC: 0.4 MG/DL (ref 0.2–1)
BUN SERPL-MCNC: 11 MG/DL (ref 6–20)
BUN/CREAT SERPL: 19 (ref 12–20)
CALCIUM SERPL-MCNC: 9.3 MG/DL (ref 8.5–10.1)
CHLORIDE SERPL-SCNC: 106 MMOL/L (ref 97–108)
CO2 SERPL-SCNC: 27 MMOL/L (ref 21–32)
CREAT SERPL-MCNC: 0.58 MG/DL (ref 0.55–1.02)
CREAT UR-MCNC: 141 MG/DL
EST. AVERAGE GLUCOSE BLD GHB EST-MCNC: 143 MG/DL
GLOBULIN SER CALC-MCNC: 3.8 G/DL (ref 2–4)
GLUCOSE SERPL-MCNC: 110 MG/DL (ref 65–100)
HBA1C MFR BLD: 6.6 % (ref 4–5.6)
MICROALBUMIN UR-MCNC: 0.77 MG/DL
MICROALBUMIN/CREAT UR-RTO: 5 MG/G (ref 0–30)
POTASSIUM SERPL-SCNC: 3.9 MMOL/L (ref 3.5–5.1)
PROT SERPL-MCNC: 7.7 G/DL (ref 6.4–8.2)
SODIUM SERPL-SCNC: 140 MMOL/L (ref 136–145)
TSH SERPL DL<=0.05 MIU/L-ACNC: 2.73 UIU/ML (ref 0.36–3.74)

## 2021-03-25 PROCEDURE — 80053 COMPREHEN METABOLIC PANEL: CPT

## 2021-03-25 PROCEDURE — 84443 ASSAY THYROID STIM HORMONE: CPT

## 2021-03-25 PROCEDURE — 83036 HEMOGLOBIN GLYCOSYLATED A1C: CPT

## 2021-03-25 PROCEDURE — 82043 UR ALBUMIN QUANTITATIVE: CPT

## 2021-03-26 NOTE — PROGRESS NOTES
Please let pt know that her lab results show that her diabetes is under good control, her thyroid level is now normal. F/up PRN or as scheduled

## 2021-04-05 NOTE — PROGRESS NOTES
Tc to the pt  was Zhou Pain. The pt was given the lab results message from the provider and advised to keep her f/u and to call the same day appt line as needed for appt. She was given the same day appt line phone number.  Omar Maldonado RN

## 2021-04-26 ENCOUNTER — TELEPHONE (OUTPATIENT)
Dept: FAMILY MEDICINE CLINIC | Age: 51
End: 2021-04-26

## 2021-04-27 ENCOUNTER — OFFICE VISIT (OUTPATIENT)
Dept: FAMILY MEDICINE CLINIC | Age: 51
End: 2021-04-27

## 2021-04-27 DIAGNOSIS — Z71.89 COUNSELING AND COORDINATION OF CARE: Primary | ICD-10-CM

## 2021-04-27 PROCEDURE — 99080 SPECIAL REPORTS OR FORMS: CPT | Performed by: PHYSICIAN ASSISTANT

## 2021-04-27 NOTE — PROGRESS NOTES
ADALBERTO PINON called patient and started financial screening for [facility]. ZI mailed documents to patient with written instructions in [place]. Pending SL and letter explaining she gets child support.

## 2021-05-20 ENCOUNTER — TELEPHONE (OUTPATIENT)
Dept: FAMILY MEDICINE CLINIC | Age: 51
End: 2021-05-20

## 2021-06-03 ENCOUNTER — TELEPHONE (OUTPATIENT)
Dept: FAMILY MEDICINE CLINIC | Age: 51
End: 2021-06-03

## 2021-11-08 ENCOUNTER — TRANSCRIBE ORDER (OUTPATIENT)
Dept: SCHEDULING | Age: 51
End: 2021-11-08

## 2021-11-08 DIAGNOSIS — Z12.31 SCREENING MAMMOGRAM FOR HIGH-RISK PATIENT: Primary | ICD-10-CM

## 2021-11-30 ENCOUNTER — OFFICE VISIT (OUTPATIENT)
Dept: FAMILY MEDICINE CLINIC | Age: 51
End: 2021-11-30

## 2021-11-30 ENCOUNTER — HOSPITAL ENCOUNTER (OUTPATIENT)
Dept: LAB | Age: 51
Discharge: HOME | End: 2021-11-30

## 2021-11-30 VITALS
HEART RATE: 74 BPM | WEIGHT: 181.8 LBS | HEIGHT: 60 IN | TEMPERATURE: 97.6 F | SYSTOLIC BLOOD PRESSURE: 144 MMHG | DIASTOLIC BLOOD PRESSURE: 92 MMHG | OXYGEN SATURATION: 96 % | BODY MASS INDEX: 35.69 KG/M2

## 2021-11-30 DIAGNOSIS — E11.9 DM TYPE 2, GOAL HBA1C 7%-8% (HCC): Primary | ICD-10-CM

## 2021-11-30 DIAGNOSIS — E11.9 DM TYPE 2, GOAL HBA1C < 7% (HCC): ICD-10-CM

## 2021-11-30 DIAGNOSIS — Z23 NEEDS FLU SHOT: ICD-10-CM

## 2021-11-30 DIAGNOSIS — Z23 ENCOUNTER FOR IMMUNIZATION: ICD-10-CM

## 2021-11-30 DIAGNOSIS — E03.9 ACQUIRED HYPOTHYROIDISM: ICD-10-CM

## 2021-11-30 DIAGNOSIS — Z71.89 COUNSELING AND COORDINATION OF CARE: Primary | ICD-10-CM

## 2021-11-30 LAB — GLUCOSE POC: NORMAL MG/DL

## 2021-11-30 PROCEDURE — 83036 HEMOGLOBIN GLYCOSYLATED A1C: CPT

## 2021-11-30 PROCEDURE — 90686 IIV4 VACC NO PRSV 0.5 ML IM: CPT

## 2021-11-30 PROCEDURE — 82962 GLUCOSE BLOOD TEST: CPT | Performed by: PHYSICIAN ASSISTANT

## 2021-11-30 PROCEDURE — 36415 COLL VENOUS BLD VENIPUNCTURE: CPT

## 2021-11-30 PROCEDURE — 84443 ASSAY THYROID STIM HORMONE: CPT

## 2021-11-30 PROCEDURE — 90471 IMMUNIZATION ADMIN: CPT

## 2021-11-30 PROCEDURE — 99213 OFFICE O/P EST LOW 20 MIN: CPT | Performed by: PHYSICIAN ASSISTANT

## 2021-11-30 PROCEDURE — 99080 SPECIAL REPORTS OR FORMS: CPT | Performed by: PHYSICIAN ASSISTANT

## 2021-11-30 NOTE — PROGRESS NOTES
Check-out Note: Flu shot   Labs   She needs to reschedule for EWL, she missed it     Printed AVS, provided to pt and reviewed. Pt indicated understanding and had no questions. Provided pt with information and phone # for [facility]. Explained that they will do a financial screening when they call before they will schedule an appointment. I explained to the patient that they will need to tell screener how many in the household are working and how much they are earning. Requests flu vaccine; denies fever, egg allergy. Immunization given per protocol and recorded in [address]. VIS information sheet given, explained possible S/E. Reviewed sx indicating need to be seen in ER. Pt had no adverse reaction at time of discharge.  Renetta Gaona RN

## 2021-11-30 NOTE — PROGRESS NOTES
Assessment/Plan:    Diagnoses and all orders for this visit:    1. DM type 2, goal HbA1c 7%-8% ([facility])  -     AMB POC GLUCOSE BLOOD, BY GLUCOSE MONITORING DEVICE    2. Acquired hypothyroidism  -     HEMOGLOBIN A1C WITH EAG; Future    3. DM type 2, goal HbA1c < 7% ([facility])  -     TSH 3RD GENERATION; Future    4. Encounter for immunization- Flu vaccine    Reschedule EWL for mammogram, she missed her appt  Meet with , she missed her eye appt with access now    Follow-up and Dispositions    · Return in about 6 months (around 5/30/2022). TIMMY Jon expressed understanding of this plan.  An AVS was printed and given to the patient.      ----------------------------------------------------------------------    Chief Complaint   Patient presents with    Diabetes     f/u    Hypertension     f/u       History of Present Illness:    Pt presents to f/up for hypothyroidism, DM and HTN  She is taking her meds as directed  She has no complaints today  She is concerned that she missed both her EWL for mammogram/ well woman and her ophthalmology appts and she would like to reschedule  She has refills on her meds  We discussed her last lab results from 6 months ago, a1c at goal of under 7 and TSH WnL     Past Medical History:   Diagnosis Date    Abnormal menses 4/29/2013    Dysmenorrhea 4/29/2013    Headache(784.0) 4/29/2013    Hx of mammogram no hx    Marital conflict involving divorce 4/29/2013    Other and unspecified hyperlipidemia 4/29/2013    Pap smear for cervical cancer screening 07/2011    Pap smear for cervical cancer screening 2011    Pap smear for cervical cancer screening unknown    Pelvic pain in female 4/29/2013    Thrombast[address]ia ([facility] [address] 4/29/2013    Unspecified essential hypertension 4/29/2013    Urinary tract infection, site not specified 4/29/2013       Current Outpatient Medications   Medication Sig Dispense Refill    levothyroxine (SYNTHROID) 25 mcg tablet TAKE 1 TABLET BY MOUTH ONCE DAILY BEFORE BREAKFAST 90 Tab 3    lisinopril-hydroCHLOROthiazide (PRINZIDE, ZESTORETIC) 20-25 mg per tablet Take 1 tablet by mouth once daily 90 Tab 3    lovastatin (MEVACOR) 40 mg tablet Take 1 Tab by mouth nightly. [place] 1 tableta por la boca cada noche. 90 Tab 3    metFORMIN ER (GLUCOPHAGE XR) 500 mg tablet Take 1 Tab by mouth daily (with dinner). [place] 1 tableta por boca con dee.  90 Tab 3    Arm Brace (Wrist Brace) [facility] Please fit pt for bilateral wrist braces for nighttime use (Patient not taking: Reported on 2021) 1 Each 2       No Known Allergies    Social History     Tobacco Use    Smoking status: Former Smoker     Types: Cigarettes     Quit date: 2013     Years since quittin.4    Smokeless tobacco: Never Used   Substance Use Topics    Alcohol use: No     Alcohol/week: 0.8 standard drinks     Types: 1 Cans of beer per week    Drug use: No       Family History   Problem Relation Age of Onset    Breast Cancer Maternal Grandmother        Physical Exam:     Visit Vitals  BP (!) 144/92 (BP 1 Location: Left upper arm, BP Patient Position: Sitting)   Pulse 74   Temp 97.6 °F (36.4 °C) (Temporal)   Ht 5' 0.12\" (1.527 m)   Wt 181 lb 12.8 oz (82.5 kg)   LMP 2015   SpO2 96%   BMI 35.37 kg/m²       A&Ox3  WDWN NAD  Respirations normal and non labored

## 2021-11-30 NOTE — PROGRESS NOTES
OW met with the patient who came to OW asking for an AN renewal. OW explained the process to patient. OW does not see a current referral for AN on patient's chart. OW told the patient she needs to wait for a call and if she doesn't receive a call back from [facility] staff in a week, she can call [facility] clinic and leave a message (if no one answers). OW provided [facility] phone number and explained to patient she needs to say name of the patient,  and phone number when she leaves a message. Pt verbalized understanding.

## 2021-11-30 NOTE — PROGRESS NOTES
Coordination of Care  1. Have you been to the ER, urgent care clinic since your last visit? Hospitalized since your last visit? No    2. Have you seen or consulted any other health care providers outside of the [address] [place] [address] since your last visit? Include any pap smears or colon screening. No    Does the patient need refills? YES    Learning Assessment Complete?  yes  Depression Screening complete in the past 12 months? yes  Results for orders placed or performed in visit on 11/30/21   AMB POC GLUCOSE BLOOD, BY GLUCOSE MONITORING DEVICE   Result Value Ref Range    Glucose  fasting MG/DL

## 2021-12-01 LAB
EST. AVERAGE GLUCOSE BLD GHB EST-MCNC: 148 MG/DL
HBA1C MFR BLD: 6.8 % (ref 4–5.6)
TSH SERPL DL<=0.05 MIU/L-ACNC: 2.13 UIU/ML (ref 0.36–3.74)

## 2021-12-01 NOTE — PROGRESS NOTES
Please send letter, a1c DM test is stable, and thyroid test is stable  Continue current meds.  Thanks

## 2021-12-03 NOTE — PROGRESS NOTES
Called and spoke with patient and informed of labs results, per Dr Yazmin Lay  Please send letter, a1c DM test is stable, and thyroid test is stable  Continue current meds. Thanks   Please sent letter to patient per Dr Yazmin Lay, thank you.

## 2022-03-18 PROBLEM — E11.9 TYPE 2 DIABETES MELLITUS WITHOUT COMPLICATION, WITHOUT LONG-TERM CURRENT USE OF INSULIN (HCC): Status: ACTIVE | Noted: 2018-06-28

## 2022-03-19 PROBLEM — E66.01 SEVERE OBESITY (HCC): Status: ACTIVE | Noted: 2019-07-18

## 2022-05-02 ENCOUNTER — TELEPHONE (OUTPATIENT)
Dept: FAMILY MEDICINE CLINIC | Age: 52
End: 2022-05-02

## 2022-05-02 NOTE — TELEPHONE ENCOUNTER
Return in about 6 months (around 5/30/2022). 5/2/22 I called patient to offer and schedule an appointment, no answer, voicemail message left.

## 2022-05-25 ENCOUNTER — TELEPHONE (OUTPATIENT)
Dept: FAMILY MEDICINE CLINIC | Age: 52
End: 2022-05-25

## 2022-05-25 NOTE — TELEPHONE ENCOUNTER
\"Return in about 6 months (around 5/30/2022). \"    5/2/22 Called patient, no answer, voicemail message left. 5/25/22, Another attempt to reach patient and offer an appointment, no answer, vm left.

## 2022-06-06 ENCOUNTER — TELEPHONE (OUTPATIENT)
Dept: FAMILY MEDICINE CLINIC | Age: 52
End: 2022-06-06

## 2022-06-06 NOTE — TELEPHONE ENCOUNTER
Kyle from the pt. She had called the cbn phone asking who had called and why. The pt had been called 2x by the [facility] psr to schedule her an appt. The psr was unable to reach the pt both times and had left the pt messages both times to call the psr back. The pt was given the front office appt line phone numbers to call the psr back to schedule her appt. Dianna Gaines was the . The pt had verified her name and  at the beginning of the phone call.  Gama Murrieta RN

## 2022-06-15 ENCOUNTER — OFFICE VISIT (OUTPATIENT)
Dept: FAMILY MEDICINE CLINIC | Age: 52
End: 2022-06-15

## 2022-06-15 VITALS
OXYGEN SATURATION: 96 % | HEIGHT: 60 IN | SYSTOLIC BLOOD PRESSURE: 168 MMHG | TEMPERATURE: 97.3 F | WEIGHT: 183 LBS | DIASTOLIC BLOOD PRESSURE: 124 MMHG | HEART RATE: 65 BPM | BODY MASS INDEX: 35.93 KG/M2

## 2022-06-15 DIAGNOSIS — Z71.89 COUNSELING AND COORDINATION OF CARE: Primary | ICD-10-CM

## 2022-06-15 DIAGNOSIS — E11.9 TYPE 2 DIABETES MELLITUS WITHOUT COMPLICATION, WITHOUT LONG-TERM CURRENT USE OF INSULIN (HCC): Primary | ICD-10-CM

## 2022-06-15 DIAGNOSIS — I10 ESSENTIAL HYPERTENSION: ICD-10-CM

## 2022-06-15 DIAGNOSIS — E03.9 ACQUIRED HYPOTHYROIDISM: ICD-10-CM

## 2022-06-15 LAB — GLUCOSE POC: NORMAL MG/DL

## 2022-06-15 PROCEDURE — 99213 OFFICE O/P EST LOW 20 MIN: CPT | Performed by: PHYSICIAN ASSISTANT

## 2022-06-15 PROCEDURE — 82962 GLUCOSE BLOOD TEST: CPT | Performed by: PHYSICIAN ASSISTANT

## 2022-06-15 PROCEDURE — 99080 SPECIAL REPORTS OR FORMS: CPT | Performed by: PHYSICIAN ASSISTANT

## 2022-06-15 RX ORDER — LOVASTATIN 40 MG/1
40 TABLET ORAL
Qty: 90 TABLET | Refills: 3 | Status: SHIPPED | OUTPATIENT
Start: 2022-06-15

## 2022-06-15 RX ORDER — LEVOTHYROXINE SODIUM 25 UG/1
TABLET ORAL
Qty: 90 TABLET | Refills: 3 | Status: SHIPPED | OUTPATIENT
Start: 2022-06-15

## 2022-06-15 RX ORDER — METFORMIN HYDROCHLORIDE 500 MG/1
500 TABLET, EXTENDED RELEASE ORAL
Qty: 90 TABLET | Refills: 3 | Status: SHIPPED | OUTPATIENT
Start: 2022-06-15

## 2022-06-15 RX ORDER — LISINOPRIL AND HYDROCHLOROTHIAZIDE 20; 25 MG/1; MG/1
TABLET ORAL
Qty: 90 TABLET | Refills: 3 | Status: SHIPPED | OUTPATIENT
Start: 2022-06-15

## 2022-06-15 NOTE — PROGRESS NOTES
OW met with patient and assisted with resources. SDOH screening was completed. Patient chose Food as primary need. (Score 3). OW provided information about [facility] pant[facility]. Patient said she wants to be seen by an Ophthalmologist. OW explained that OWs cannot complete a FA application for specialists unless the patient has been referred by a provider. OW also told the patient that when patients are referred by providers to either AN or [facility] programs, patients will receive a call from petra Nelson. Patient verbalized understanding. OW provided OW's contact information to patient in case patient needs further assistance.

## 2022-06-15 NOTE — PROGRESS NOTES
Results for orders placed or performed in visit on 06/15/22   AMB POC GLUCOSE BLOOD, BY GLUCOSE MONITORING DEVICE   Result Value Ref Range    Glucose POC 133nf MG/DL     Coordination of Care  1. Have you been to the ER, urgent care clinic since your last visit? Hospitalized since your last visit? No    2. Have you seen or consulted any other health care providers outside of the [address] [place] [address] since your last visit? Include any pap smears or colon screening. No    Does the patient need refills? YES    Learning Assessment Complete?  yes

## 2022-06-15 NOTE — PROGRESS NOTES
CMA printed and reviewed with patient AVS and discharge instructions per provider. Patient made aware to schedule f/u appointment for 4 weeks. CMA brought patient to Leroy Clinton out reach worker to talk about AN program for eye problem. This has been fully explained to the patient, who indicates understanding. Patient had no more questions for CMA.

## 2022-06-16 NOTE — PROGRESS NOTES
Assessment/Plan:    Diagnoses and all orders for this visit:    1. Type 2 diabetes mellitus without complication, without long-term current use of insulin (HCC)  -     AMB POC GLUCOSE BLOOD, BY GLUCOSE MONITORING DEVICE  -     metFORMIN ER (GLUCOPHAGE XR) 500 mg tablet; Take 1 Tablet by mouth daily (with dinner). [place] 1 tableta por boca con dee. 2. Essential hypertension  -     lovastatin (MEVACOR) 40 mg tablet; Take 1 Tablet by mouth nightly. [place] 1 tableta por la boca cada noche.  -     lisinopril-hydroCHLOROthiazide (PRINZIDE, ZESTORETIC) 20-25 mg per tablet; Take 1 tablet by mouth once daily    3. Acquired hypothyroidism  -     levothyroxine (SYNTHROID) 25 mcg tablet; TAKE 1 TABLET BY MOUTH ONCE DAILY BEFORE BREAKFAST    Pt has stopped her medication \"months ago\" because she does not want to take medication  She asks for a referral for eye exam- last year, when I referred her, she never brought in the necessary documents  Additionally, she has not been on her diabetic meds for months  I have asked her to meet with  to see if she would like to go through access now for eye appt, if she wants to bring in the paperwork then I will re-refer her  Otherwise, she will need to go as self pay to community clinic like [facility] or [facility]  She agrees to restart meds     Follow-up and Dispositions    · Return in about 4 weeks (around 7/13/2022). TIMMY Kelly expressed understanding of this plan. An AVS was printed and given to the patient.      ----------------------------------------------------------------------    Chief Complaint   Patient presents with    Diabetes    Hypertension       History of Present Illness:    Pt here for check up. Off all meds for months, not because she didn't have refills but bc she \"doesn't want to take pills\". The reason is that sometimes she gets upset stomach when she takes pills on empty belly.  She does not have chest pain, SOB or GRIFFIN (bp is high). She thinks that her bp is very high due to the stress that her husbands nephew is causing her- he has court today for vandalism. We worked together to come up with a plan that was agreeable to her     Past Medical History:   Diagnosis Date    Abnormal menses 2013    Dysmenorrhea 2013    Headache(784.0) 2013    Hx of mammogram no hx    Marital conflict involving divorce 2013    Other and unspecified hyperlipidemia 2013    Pap smear for cervical cancer screening 2011    Pap smear for cervical cancer screening     Pap smear for cervical cancer screening unknown    Pelvic pain in female 2013    Thrombasthenia ([address] 2013    Unspecified essential hypertension 2013    Urinary tract infection, site not specified 2013       Current Outpatient Medications   Medication Sig Dispense Refill    metFORMIN ER (GLUCOPHAGE XR) 500 mg tablet Take 1 Tablet by mouth daily (with dinner). [place] 1 tableta por boca con dee. 90 Tablet 3    lovastatin (MEVACOR) 40 mg tablet Take 1 Tablet by mouth nightly. [place] 1 tableta por la boca cada noche.  90 Tablet 3    lisinopril-hydroCHLOROthiazide (PRINZIDE, ZESTORETIC) 20-25 mg per tablet Take 1 tablet by mouth once daily 90 Tablet 3    levothyroxine (SYNTHROID) 25 mcg tablet TAKE 1 TABLET BY MOUTH ONCE DAILY BEFORE BREAKFAST 90 Tablet 3    Arm Brace (Wrist Brace) misc Please fit pt for bilateral wrist braces for nighttime use (Patient not taking: Reported on 2021) 1 Each 2       No Known Allergies    Social History     Tobacco Use    Smoking status: Former Smoker     Types: Cigarettes     Quit date: 2013     Years since quittin.9    Smokeless tobacco: Never Used   Substance Use Topics    Alcohol use: No     Alcohol/week: 0.8 standard drinks     Types: 1 Cans of beer per week    Drug use: No       Family History   Problem Relation Age of Onset    Breast Cancer Maternal Grandmother Physical Exam:     Visit Vitals  BP (!) 168/124 (BP 1 Location: Left upper arm, BP Patient Position: Sitting)   Pulse 65   Temp 97.3 °F (36.3 °C) (Temporal)   Ht 5' 0.12\" (1.527 m)   Wt 183 lb (83 kg)   LMP 05/31/2015   SpO2 96%   BMI 35.60 kg/m²       A&Ox3  WDWN NAD  Respirations normal and non labored

## 2022-08-01 ENCOUNTER — TELEPHONE (OUTPATIENT)
Dept: FAMILY MEDICINE CLINIC | Age: 52
End: 2022-08-01

## 2022-08-19 ENCOUNTER — TELEPHONE (OUTPATIENT)
Dept: FAMILY MEDICINE CLINIC | Age: 52
End: 2022-08-19

## 2023-01-10 NOTE — TELEPHONE ENCOUNTER
Eduard Cockayne  Call main office and left a VM 11/11/19  And left no name or date of birth press #9 to see who was calling . Patient call was returned 11/11/19 @ 2:45pm  Patient  Just wanted to know  Make sure that her apt was still for 11/14/19 .     Thank you
Oriented - self; Oriented - place; Oriented - time